# Patient Record
Sex: MALE | Race: WHITE | NOT HISPANIC OR LATINO | Employment: OTHER | URBAN - METROPOLITAN AREA
[De-identification: names, ages, dates, MRNs, and addresses within clinical notes are randomized per-mention and may not be internally consistent; named-entity substitution may affect disease eponyms.]

---

## 2017-07-05 PROBLEM — E78.5 HYPERLIPIDEMIA: Status: ACTIVE | Noted: 2017-07-05

## 2017-07-05 PROBLEM — G47.00 INSOMNIA: Status: ACTIVE | Noted: 2017-07-05

## 2019-01-16 PROBLEM — G47.00 INSOMNIA: Status: RESOLVED | Noted: 2017-07-05 | Resolved: 2019-01-16

## 2019-11-19 ENCOUNTER — OFFICE VISIT (OUTPATIENT)
Dept: PODIATRY | Facility: CLINIC | Age: 69
End: 2019-11-19
Payer: MEDICARE

## 2019-11-19 VITALS
BODY MASS INDEX: 39.49 KG/M2 | HEART RATE: 68 BPM | SYSTOLIC BLOOD PRESSURE: 120 MMHG | HEIGHT: 65 IN | WEIGHT: 237 LBS | DIASTOLIC BLOOD PRESSURE: 57 MMHG

## 2019-11-19 DIAGNOSIS — M79.675 TOE PAIN, BILATERAL: Primary | ICD-10-CM

## 2019-11-19 DIAGNOSIS — M79.674 TOE PAIN, BILATERAL: Primary | ICD-10-CM

## 2019-11-19 DIAGNOSIS — L84 CORN OR CALLUS: ICD-10-CM

## 2019-11-19 PROCEDURE — 99999 PR PBB SHADOW E&M-NEW PATIENT-LVL III: CPT | Mod: PBBFAC,,, | Performed by: PODIATRIST

## 2019-11-19 PROCEDURE — 99203 PR OFFICE/OUTPT VISIT, NEW, LEVL III, 30-44 MIN: ICD-10-PCS | Mod: S$PBB,,, | Performed by: PODIATRIST

## 2019-11-19 PROCEDURE — 99999 PR PBB SHADOW E&M-NEW PATIENT-LVL III: ICD-10-PCS | Mod: PBBFAC,,, | Performed by: PODIATRIST

## 2019-11-19 PROCEDURE — 99203 OFFICE O/P NEW LOW 30 MIN: CPT | Mod: S$PBB,,, | Performed by: PODIATRIST

## 2019-11-19 PROCEDURE — 1159F PR MEDICATION LIST DOCUMENTED IN MEDICAL RECORD: ICD-10-PCS | Mod: ,,, | Performed by: PODIATRIST

## 2019-11-19 PROCEDURE — 1126F PR PAIN SEVERITY QUANTIFIED, NO PAIN PRESENT: ICD-10-PCS | Mod: ,,, | Performed by: PODIATRIST

## 2019-11-19 PROCEDURE — 1126F AMNT PAIN NOTED NONE PRSNT: CPT | Mod: ,,, | Performed by: PODIATRIST

## 2019-11-19 PROCEDURE — 99203 OFFICE O/P NEW LOW 30 MIN: CPT | Mod: PBBFAC,PN | Performed by: PODIATRIST

## 2019-11-19 PROCEDURE — 1159F MED LIST DOCD IN RCRD: CPT | Mod: ,,, | Performed by: PODIATRIST
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
## 2020-09-17 ENCOUNTER — OFFICE VISIT (OUTPATIENT)
Dept: PAIN MEDICINE | Facility: CLINIC | Age: 70
End: 2020-09-17
Payer: MEDICARE

## 2020-09-17 VITALS
WEIGHT: 240 LBS | RESPIRATION RATE: 20 BRPM | HEIGHT: 65 IN | SYSTOLIC BLOOD PRESSURE: 115 MMHG | DIASTOLIC BLOOD PRESSURE: 53 MMHG | TEMPERATURE: 98 F | HEART RATE: 62 BPM | BODY MASS INDEX: 39.99 KG/M2 | OXYGEN SATURATION: 96 %

## 2020-09-17 DIAGNOSIS — M51.36 DDD (DEGENERATIVE DISC DISEASE), LUMBAR: ICD-10-CM

## 2020-09-17 DIAGNOSIS — M47.816 LUMBAR SPONDYLOSIS: Primary | ICD-10-CM

## 2020-09-17 DIAGNOSIS — M54.16 LUMBAR RADICULOPATHY: ICD-10-CM

## 2020-09-17 DIAGNOSIS — Z11.9 SCREENING EXAMINATION FOR INFECTIOUS DISEASE: ICD-10-CM

## 2020-09-17 PROCEDURE — 99204 OFFICE O/P NEW MOD 45 MIN: CPT | Mod: S$PBB,,, | Performed by: ANESTHESIOLOGY

## 2020-09-17 PROCEDURE — 99204 PR OFFICE/OUTPT VISIT, NEW, LEVL IV, 45-59 MIN: ICD-10-PCS | Mod: S$PBB,,, | Performed by: ANESTHESIOLOGY

## 2020-09-17 PROCEDURE — 99999 PR PBB SHADOW E&M-EST. PATIENT-LVL V: ICD-10-PCS | Mod: PBBFAC,,, | Performed by: ANESTHESIOLOGY

## 2020-09-17 PROCEDURE — 99999 PR PBB SHADOW E&M-EST. PATIENT-LVL V: CPT | Mod: PBBFAC,,, | Performed by: ANESTHESIOLOGY

## 2020-09-17 PROCEDURE — 99215 OFFICE O/P EST HI 40 MIN: CPT | Mod: PBBFAC,PN | Performed by: ANESTHESIOLOGY

## 2020-09-17 RX ORDER — SODIUM CHLORIDE, SODIUM LACTATE, POTASSIUM CHLORIDE, CALCIUM CHLORIDE 600; 310; 30; 20 MG/100ML; MG/100ML; MG/100ML; MG/100ML
INJECTION, SOLUTION INTRAVENOUS CONTINUOUS
Status: CANCELLED | OUTPATIENT
Start: 2020-09-29
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
## 2020-09-17 NOTE — LETTER
September 18, 2020      Mk Russell MD  [address]  [place] [address]           [place] [facility]  [address]  [facility] [address]  Phone: [phone]  Fax: [phone]          Patient: Fox Bell   MR Number: [id number]   YOB: 1950   Date of Visit: 9/17/2020       Dear Dr. Mk Russell:    Thank you for referring Fox Bell to me for evaluation. Attached you will find relevant portions of my assessment and plan of care.    If you have questions, please do not hesitate to call me. I look forward to following Fox Bell along with you.    Sincerely,    Lemuel Christian MD    Enclosure  CC:  No Recipients    If you would like to receive this communication electronically, please contact [email or web address] or [phone] to request more information on [employer] access.    For providers and/or their staff who would like to refer a patient to [facility], please contact us through our one-stop-shop provider referral line, [facility], at [phone].    If you feel you have received this communication in error or would no longer like to receive these types of communications, please e-mail [email or web address]

## 2020-09-26 ENCOUNTER — LAB VISIT (OUTPATIENT)
Dept: FAMILY MEDICINE | Facility: CLINIC | Age: 70
End: 2020-09-26
Payer: MEDICARE

## 2020-09-26 DIAGNOSIS — Z11.9 SCREENING EXAMINATION FOR INFECTIOUS DISEASE: ICD-10-CM

## 2020-09-26 PROCEDURE — U0003 INFECTIOUS AGENT DETECTION BY NUCLEIC ACID (DNA OR RNA); SEVERE ACUTE RESPIRATORY SYNDROME CORONAVIRUS 2 (SARS-COV-2) (CORONAVIRUS DISEASE [COVID-19]), AMPLIFIED PROBE TECHNIQUE, MAKING USE OF HIGH THROUGHPUT TECHNOLOGIES AS DESCRIBED BY CMS-2020-01-R: HCPCS

## 2020-09-27 LAB — SARS-COV-2 RNA RESP QL NAA+PROBE: NOT DETECTED

## 2020-09-28 ENCOUNTER — TELEPHONE (OUTPATIENT)
Dept: PAIN MEDICINE | Facility: CLINIC | Age: 70
End: 2020-09-28

## 2020-09-28 NOTE — TELEPHONE ENCOUNTER
Patient asked could he attend physical therapy the day after his procedure. Informed patient that is fine and to perform activities as tolerated. Patient also asked about restarting his aspirin and multivitamins after the procedure. Advise patient that was okay.

## 2020-09-28 NOTE — TELEPHONE ENCOUNTER
----- Message from Ruby Trejo sent at 9/28/2020  8:14 AM CDT -----  Patient is asking for a return call regarding his procedure tomorrow.  He has questions.  His number is [phone].  Thank you!

## 2020-09-29 ENCOUNTER — HOSPITAL ENCOUNTER (OUTPATIENT)
Dept: RADIOLOGY | Facility: HOSPITAL | Age: 70
Discharge: HOME | End: 2020-09-29
Attending: ANESTHESIOLOGY
Payer: MEDICARE

## 2020-09-29 ENCOUNTER — HOSPITAL ENCOUNTER (OUTPATIENT)
Facility: HOSPITAL | Age: 70
Discharge: HOME | End: 2020-09-29
Attending: ANESTHESIOLOGY | Admitting: ANESTHESIOLOGY
Payer: MEDICARE

## 2020-09-29 DIAGNOSIS — M54.16 LUMBAR RADICULOPATHY: Primary | ICD-10-CM

## 2020-09-29 DIAGNOSIS — M51.36 DDD (DEGENERATIVE DISC DISEASE), LUMBAR: ICD-10-CM

## 2020-09-29 PROCEDURE — 62323 PR INJ LUMBAR/SACRAL, W/IMAGING GUIDANCE: ICD-10-PCS | Mod: ,,, | Performed by: ANESTHESIOLOGY

## 2020-09-29 PROCEDURE — 76000 FLUOROSCOPY <1 HR PHYS/QHP: CPT | Mod: TC,PO

## 2020-09-29 PROCEDURE — 63600175 PHARM REV CODE 636 W HCPCS: Mod: PO | Performed by: ANESTHESIOLOGY

## 2020-09-29 PROCEDURE — 62323 NJX INTERLAMINAR LMBR/SAC: CPT | Mod: PO | Performed by: ANESTHESIOLOGY

## 2020-09-29 PROCEDURE — 25000003 PHARM REV CODE 250: Mod: PO | Performed by: ANESTHESIOLOGY

## 2020-09-29 PROCEDURE — A4216 STERILE WATER/SALINE, 10 ML: HCPCS | Mod: PO | Performed by: ANESTHESIOLOGY

## 2020-09-29 PROCEDURE — 62323 NJX INTERLAMINAR LMBR/SAC: CPT | Mod: ,,, | Performed by: ANESTHESIOLOGY

## 2020-09-29 PROCEDURE — 25500020 PHARM REV CODE 255: Mod: PO | Performed by: ANESTHESIOLOGY

## 2020-09-29 RX ORDER — SODIUM CHLORIDE, SODIUM LACTATE, POTASSIUM CHLORIDE, CALCIUM CHLORIDE 600; 310; 30; 20 MG/100ML; MG/100ML; MG/100ML; MG/100ML
INJECTION, SOLUTION INTRAVENOUS CONTINUOUS
Status: DISCONTINUED | OUTPATIENT
Start: 2020-09-29 | End: 2020-09-29

## 2020-09-29 RX ORDER — ALPRAZOLAM 0.5 MG/1
1 TABLET, ORALLY DISINTEGRATING ORAL ONCE
Status: COMPLETED | OUTPATIENT
Start: 2020-09-29 | End: 2020-09-29

## 2020-09-29 RX ORDER — METHYLPREDNISOLONE ACETATE 80 MG/ML
INJECTION, SUSPENSION INTRA-ARTICULAR; INTRALESIONAL; INTRAMUSCULAR; SOFT TISSUE
Status: DISCONTINUED | OUTPATIENT
Start: 2020-09-29 | End: 2020-09-29 | Stop reason: HOSPADM

## 2020-09-29 RX ORDER — SODIUM CHLORIDE 9 MG/ML
INJECTION, SOLUTION INTRAMUSCULAR; INTRAVENOUS; SUBCUTANEOUS
Status: DISCONTINUED | OUTPATIENT
Start: 2020-09-29 | End: 2020-09-29 | Stop reason: HOSPADM

## 2020-09-29 RX ORDER — LIDOCAINE HYDROCHLORIDE 10 MG/ML
INJECTION, SOLUTION EPIDURAL; INFILTRATION; INTRACAUDAL; PERINEURAL
Status: DISCONTINUED | OUTPATIENT
Start: 2020-09-29 | End: 2020-09-29 | Stop reason: HOSPADM

## 2020-09-29 RX ADMIN — ALPRAZOLAM 1 MG: 0.5 TABLET, ORALLY DISINTEGRATING ORAL at 02:09

## 2020-09-29 NOTE — DISCHARGE SUMMARY
[facility]  Discharge Note  Short Stay    Admit Date: 9/29/2020    Discharge Date: 9/29/2020    Attending Physician: Lemuel Christian MD     Discharge Provider: Lemuel Christian    Diagnoses:  Active Hospital Problems    Diagnosis  POA    *Lumbar radiculopathy [M54.16]  Yes      Resolved Hospital Problems   No resolved problems to display.       Discharged Condition: good    Final Diagnoses: Lumbar radiculopathy [M54.16]    Disposition: Home or Self Care    Hospital Course: no complications, uneventful    Outcome of Hospitalization, Treatment, Procedure, or Surgery:  Patient was admitted for outpatient procedure. The patient underwent procedure without complications and are discharged home    Follow up/Patient Instructions:  Follow up as scheduled in Pain Management clinic in 3-4 weeks/Patient has received instructions and follow up date and time    Medications:  Continue previous medications    Discharge Procedure Orders   Call MD for:  temperature >100.4     Call MD for:  severe uncontrolled pain     Call MD for:  redness, tenderness, or signs of infection (pain, swelling, redness, odor or green/yellow discharge around incision site)     Call MD for:  severe persistent headache     No dressing needed         Discharge Procedure Orders (must include Diet, Follow-up, Activity):   Discharge Procedure Orders (must include Diet, Follow-up, Activity)   Call MD for:  temperature >100.4     Call MD for:  severe uncontrolled pain     Call MD for:  redness, tenderness, or signs of infection (pain, swelling, redness, odor or green/yellow discharge around incision site)     Call MD for:  severe persistent headache     No dressing needed

## 2020-09-29 NOTE — DISCHARGE INSTRUCTIONS
PAIN MANAGEMENT    Home care instructions   Apply ice pack to the injection site for 20 minute prior for the first 24 hours for soreness/discomfort at injection site   DO NOT USE HEAT FOR 24 HOURS   Keep site clean and dry for 24 hours, remove bandaid when desired   Do not drive until tomorrow  Take care when walking after a lumbar injection     STEROIDS    May take 10-14 days for full effects  Avoid strenuous exercises for 2 days  Resume Aspirin, Plavix, or Coumadin the day after the procedure unless other wise instructed  Resume home medication as prescribed today      CALL PHYSICIAN FOR:   Severe increase in your usual pain or appearance of new pain   Prolonged or increasing weakness or numbness in the legs or arms   Fever greater then 100 degrees F..   Drainage from the incision site, redness, active bleeding or increased swelling at the injection site   Headache that increases when your head is upright and decreases when you lie flat    FOR EMERGENCIES:   Go directly to Emergency Department for Shortness of breath, chest pain, or problems breathing     Routed to Dr. Kobi CARTER.

## 2020-09-29 NOTE — OP NOTE

## 2020-09-30 ENCOUNTER — CLINICAL SUPPORT (OUTPATIENT)
Dept: REHABILITATION | Facility: HOSPITAL | Age: 70
End: 2020-09-30
Attending: ANESTHESIOLOGY
Payer: MEDICARE

## 2020-09-30 VITALS
OXYGEN SATURATION: 95 % | RESPIRATION RATE: 16 BRPM | WEIGHT: 238 LBS | TEMPERATURE: 99 F | BODY MASS INDEX: 39.65 KG/M2 | HEART RATE: 57 BPM | HEIGHT: 65 IN | SYSTOLIC BLOOD PRESSURE: 126 MMHG | DIASTOLIC BLOOD PRESSURE: 61 MMHG

## 2020-09-30 DIAGNOSIS — M54.50 LUMBAR PAIN: ICD-10-CM

## 2020-09-30 DIAGNOSIS — R53.1 WEAKNESS: ICD-10-CM

## 2020-09-30 DIAGNOSIS — M51.36 DDD (DEGENERATIVE DISC DISEASE), LUMBAR: ICD-10-CM

## 2020-09-30 DIAGNOSIS — M47.816 LUMBAR SPONDYLOSIS: ICD-10-CM

## 2020-09-30 PROCEDURE — 97161 PT EVAL LOW COMPLEX 20 MIN: CPT | Mod: PO | Performed by: PHYSICAL THERAPIST

## 2020-09-30 NOTE — PLAN OF CARE
"[facility]  Physical Therapy Initial Evaluation    Name: Fox Bell  Clinic Number: [id number]    Therapy Diagnosis:   Encounter Diagnoses   Name Primary?    Lumbar spondylosis     DDD (degenerative disc disease), lumbar     Lumbar pain     Weakness      Physician: Lemuel Christian MD    Physician Orders: PT Eval and Treat   Medical Diagnosis from Referral: Lumbar spondylosis  Evaluation Date: 2020  Authorization Period Expiration: 20  Plan of Care Expiration: 20  Visit # / Visits authorized:     Time In: 11:03 AM   Time Out: 11:38 AM   Total Billable Time: 35 minutes    Precautions: Standard    Subjective   Date of onset: 2020  History of current condition - Fox reports: Having intermittent back pain for several years. He had an exacerbation of his back pain in July after bending over and picking vegetables. He states that he had pain in his back that radiated down the back of his leg to the knee. He also reports having pain after prolonged periods of sitting. He had an LENA yesterday and reports that he doesn't have any pain. He also reports difficulty ascending/descending stairs.        Past Medical History:   Diagnosis Date    b Hypertension     On Dyazide 37.5/25 Mg qAM, And Lisinopril 20 Mg Daily, Toprol-XL 50 Mg Daily, And Procardia-XL 30 Mg Daily    c Hypercholesterolemia     On Crestor 10 Mg qHS    d Family H/O DM     f Obesity     i VIBHA On CPAP     19 Referred To Dr. Candelaria Segovia; Dr. Farrah haywood External Hemorrhoids     Dr. TRE haywood H/O Colon Polyps On Previous TC     Dr. TRE Ness (With No Polyps On Follow-Up 17 TC): "Repeat TC In 3 Years"    k Borderline Low Testosterone Level     18 Total Testosterone = 377 (300-720); 18 Free Testosterone = 1.4 % (1.6-2.9%)    k Family H/O Prostate Cancer     His  Father    l Advanced Bilateral Knee Osteoarthritis     Dr. HOLLAND Giraldo    " l Lumbar Spinal Degenerative Disc Disease     7/26/20 Referred To Dr. Lemuel Christian; 7/22/20 L-Spine MRI W/O Contrast = (See Report)    m Chronic Bilateral Hand Tremor     His Father And Brother Also Have Tremors    m Chronic Fatigue     6/27/18 TSH = Normal; 7/19/18 Total Testosterone = 377 (300-720); 7/19/18 Free Testosterone = 1.4 % (1.6-2.9%)    n Chronic Insomnia     1/16/19 RXd Trazodone  Mg qHS PRN; On Sonata 10 Mg qHS PRN    o Allergic Rhinosinusitis     Wellness Visit 7/17/20      Fox Bell  has a past surgical history that includes Knee arthroscopy (Right); Knee surgery (Left); Bladder surgery; arm surgery (Left, 1979); and Epidural steroid injection into lumbar spine (N/A, 9/29/2020).    Fox has a current medication list which includes the following prescription(s): aspirin, glucosamine/chondro mckeon a, lisinopril, meloxicam, metoprolol succinate, mv-mins/folic/lycopene/ginkgo, nifedipine, rosuvastatin, trazodone, triamcinolone acetonide 0.1%, triamcinolone acetonide 0.5%, triamterene-hydrochlorothiazide 37.5-25 mg, UNABLE TO FIND, and zaleplon.    Review of patient's allergies indicates:   Allergen Reactions    Hydrocodone-acetaminophen Hives     Other reaction(s): hives, itchy        Imaging, MRI studies:    1. Multilevel spondylosis without significant spinal canal stenosis.  2. Multilevel foraminal stenosis, greatest on the right at L4-5 and the left at L5-S1 where it is mild-moderate.    Prior Therapy: None  Social History:  lives with their spouse in a single story home   Occupation: retired   Prior Level of Function: No limitations related to LBP   Current Level of Function: Difficulty ascending/descending stairs, reduced tolerance to prolonged sitting     Pain:  Current 0/10, worst 4/10, best 0/10   Location: rightl back   Description: Shooting  Aggravating Factors: Sitting and Bending  Easing Factors: changing positions     Pts goals: To decrease back pain and improve ease of  ascending/descending stairs       Objective   Mental status: alert, oriented x3  Posture/ Alignment: Fair, reduced lumbar lordosis     GAIT DEVIATIONS: Fox matamoros with normal gait mechanics .    ROM:   AROM  Comment   Flexion: Mild loss      Extension: Moerate loss      Lat Flex R: WNL     Lat Flex L: WNL     Rotation R: WNL     Rotation L: WNL     *pain    Strength: Dermatomes:   Right Left Comment   L2 intact intact    L3 intact intact    L4 intact intact    L5 intact intact    S1 intact intact    S2 intact intact    Saddle intact intact      Myotomes:   Right Left Comment   Hip flexion (L2-3): 5/5 5/5    Knee extension (L3-4): 5/5 5/5    DF (L4-5): 5/5 5/5    Great Toe Ext (L5-S1): 5/5 5/5    Great Toe Flex (S1-S2): 5/5 5/5      Hip extension: 4/5 bilaterally      DTR:   Right Left Comment   Patellar (L3-4) 2+ 2+    Achilles (S1) 1+ 1+        Special Tests:  Repeated ROM ROM (% of normal) Pain? Radiation?   Flex Stand:      Ext Stand:      Flex Supine: 100% Y    Ext Prone: 100% N N     Functional Tests (* indicates w/ pain)   30 second stand test: 15x    Palpation:  No TTP elicited     Pt/family was provided educational information, including: role of PT, goals for PT, scheduling - pt verbalized understanding. Discussed insurance plan with pt.       CMS Impairment/Limitation/Restriction for FOTO Lumbar Survey    Therapist reviewed FOTO scores for Fox Bell on 9/30/2020.   FOTO documents entered into [facility] - see Media section.    Limitation Score: 37%  Category: Mobility    Current : CJ = at least 20% but < 40% impaired, limited or restricted               TREATMENT   Treatment Time In: 11:33 AM   Treatment Time Out: 11:38 AM   Total Treatment time separate from Evaluation: 5 minutes    Fox received therapeutic exercises to develop strength for 5 minutes including:  HEP setup and instruction; handout issued     Home Exercises and Patient Education Provided    Education provided:   - Pathophysiology of condition    - HEP   - POC     Written Home Exercises Provided: yes.  Exercises were reviewed and Fox was able to demonstrate them prior to the end of the session.  Fox demonstrated good  understanding of the education provided.     See EMR under Patient Instructions for exercises provided 9/30/2020.      Assessment   Pt presents with a medical diagnosis of Lumbar spondylosis. He has intermittent lumbar and lower extremity pain, limited lumbar ROM and LE weakness. These impairments are limiting his ability to perform activities that require frequent forward bending and stair climbing. His symptoms are consistent with a lumbar derangement. He will benefit from physical therapy treatment to assist in reducing symptoms and restoring function.     Pt prognosis is Excellent.   Pt will benefit from skilled outpatient Physical Therapy to address the deficits stated above and in the chart below, provide pt/family education, and to maximize pt's level of independence.     Plan of care discussed with patient: Yes  Pt's spiritual, cultural and educational needs considered and patient is agreeable to the plan of care and goals as stated below:     Anticipated Barriers for therapy: None     Medical Necessity is demonstrated by the following  History  Co-morbidities and personal factors that may impact the plan of care Co-morbidities:   CAD, high BMI, HTN and VIBHA    Personal Factors:   age     moderate   Examination  Body Structures and Functions, activity limitations and participation restrictions that may impact the plan of care Body Regions:   back  lower extremities    Body Systems:    ROM  strength  transitions    Participation Restrictions:       Activity limitations:   Learning and applying knowledge  no deficits    General Tasks and Commands  no deficits    Communication  no deficits    Mobility  ascending/descending stairs     Self care  no deficits    Domestic Life  doing house work (cleaning house, washing dishes,  laundry)    Interactions/Relationships  no deficits    Life Areas  no deficits    Community and Social Life  no deficits         moderate   Clinical Presentation stable and uncomplicated low   Decision Making/ Complexity Score: low     Goals:  Short Term Goals: 3 weeks   1) Pt will be I with HEP   2) Pt will have pain no worse then 2/10 during ADL activities     Long Term Goals: 6 weeks   1) Patient will ascend/descend 2 flights of stairs without limitations   2) Pt will perform all ADL activities without limitations related to lumbar pain         Plan   Plan of care Certification: 9/30/2020 to 11/11/20.    Outpatient Physical Therapy 2 times weekly for 6 weeks to include the following interventions: Manual Therapy, Neuromuscular Re-ed, Patient Education, Therapeutic Activites and Therapeutic Exercise.     Dany Waller, PT

## 2020-10-05 NOTE — PROGRESS NOTES
Physical Therapy Daily Treatment Note     Name: Fox Bell  Clinic Number: [id number]    Therapy Diagnosis:   Encounter Diagnoses   Name Primary?    Lumbar pain     Weakness      Physician: Lemuel Christian MD    Visit Date: 10/6/2020  Physician Orders: PT Eval and Treat   Medical Diagnosis from Referral: Lumbar spondylosis  Evaluation Date: 9/30/2020  Authorization Period Expiration: 12/31/20  Plan of Care Expiration: 11/11/20  Visit # / Visits authorized: 1/      Time In: 1300  Time Out: 1340  Total Billable Time: 40 minutes     Precautions: Standard      Subjective     Pt reports: Had a quick pain in the buttocks since the evaluation but it only occurred once.  Pt reports he has been trying to walk 2 miles a day.  He was compliant with home exercise program.  Response to previous treatment:no soreness  Functional change: too soon to tell    Pain: 0/10  Location: right back      Objective     Fox received therapeutic exercises to develop strength for 40minutes including:    LTR x 10 - cueing to avoid holding his breath  Piriformis stretch 3 x 30 sec  Prone on elbows x 3 min  Prone press ups x 10  Prone hip extension x 10  Bridges x 10  SL clamshells RTB 2 x 10      Standing lumbar extension x 10  Standing hip extension x 10  Sit to stand 2 x 10 lowering table for 2nd set  Step ups x 10  Gastroc stretch 3 x 30 sec  Mini squats x 10  Leg Press #50 2 x 10    Home Exercises Provided and Patient Education Provided     Education provided:   - HEP    Written Home Exercises Provided: yes.  Exercises were reviewed and Fox was able to demonstrate them prior to the end of the session.  Fox demonstrated good  understanding of the education provided.     See EMR under Media for exercises provided 10/6/2020.    Assessment     Pt tolerated treatment very well with no reports of any increase in symptoms. Depending on patient's response, he should be able to be progressed next treatment.  Fox is progressing well  towards his goals.   Pt prognosis is Excellent.     Pt will continue to benefit from skilled outpatient physical therapy to address the deficits listed in the problem list box on initial evaluation, provide pt/family education and to maximize pt's level of independence in the home and community environment.     Pt's spiritual, cultural and educational needs considered and pt agreeable to plan of care and goals.    Anticipated barriers to physical therapy: none    Goals:     Short Term Goals: 3 weeks   1) Pt will be I with HEP   2) Pt will have pain no worse then 2/10 during ADL activities      Long Term Goals: 6 weeks   1) Patient will ascend/descend 2 flights of stairs without limitations   2) Pt will perform all ADL activities without limitations related to lumbar pain          Plan     Plan of care Certification: 9/30/2020 to 11/11/20.         Dianne Gomes, PTA

## 2020-10-06 ENCOUNTER — CLINICAL SUPPORT (OUTPATIENT)
Dept: REHABILITATION | Facility: HOSPITAL | Age: 70
End: 2020-10-06
Attending: ANESTHESIOLOGY
Payer: MEDICARE

## 2020-10-06 DIAGNOSIS — M54.50 LUMBAR PAIN: ICD-10-CM

## 2020-10-06 DIAGNOSIS — R53.1 WEAKNESS: ICD-10-CM

## 2020-10-06 PROCEDURE — 97110 THERAPEUTIC EXERCISES: CPT | Mod: PO,CQ

## 2020-10-08 ENCOUNTER — CLINICAL SUPPORT (OUTPATIENT)
Dept: REHABILITATION | Facility: HOSPITAL | Age: 70
End: 2020-10-08
Attending: ANESTHESIOLOGY
Payer: MEDICARE

## 2020-10-08 DIAGNOSIS — M54.50 LUMBAR PAIN: ICD-10-CM

## 2020-10-08 DIAGNOSIS — R53.1 WEAKNESS: ICD-10-CM

## 2020-10-08 PROCEDURE — 97110 THERAPEUTIC EXERCISES: CPT | Mod: PO,CQ

## 2020-10-08 NOTE — PROGRESS NOTES
Physical Therapy Daily Treatment Note     Name: Fox Bell  Clinic Number: [id number]    Therapy Diagnosis:   Encounter Diagnoses   Name Primary?    Lumbar pain     Weakness      Physician: Lemuel Christian MD    Visit Date: 10/8/2020  Physician Orders: PT Eval and Treat   Medical Diagnosis from Referral: Lumbar spondylosis  Evaluation Date: 9/30/2020  Authorization Period Expiration: 12/31/20  Plan of Care Expiration: 11/11/20  Visit # / Visits authorized: 2/20     Time In: 1302  Time Out: 1442  Total Billable Time: 40 minutes     Precautions: Standard      Subjective     Pt reports: no complaints  He was compliant with home exercise program.  Response to previous treatment:no soreness  Functional change: too soon to tell    Pain: 0/10  Location: right back      Objective     Fox received therapeutic exercises to develop strength for 40 minutes including:    LTR x 15 - cueing to avoid holding his breath  Piriformis stretch 3 x 30 sec  Prone on elbows x 2 min  Prone press ups x 10  Prone hip extension x 10  Bridges 2x10  SL clamshells GTB 2 x 10      Standing lumbar extension 2x10  Standing hip extension 2x10  Sit to stand 2 x 10  Step ups 2x10  Gastroc stretch 3 x 30 sec  Mini squats 2x10      Leg Press #60 2 x 10  Lumbar extension machine 60# 2x10    Home Exercises Provided and Patient Education Provided     Education provided:   - HEP    Written Home Exercises Provided: Patient instructed to cont prior HEP.  Exercises were reviewed and Fox was able to demonstrate them prior to the end of the session.  Fox demonstrated good  understanding of the education provided.     See EMR under Media for exercises provided 10/6/2020.    Assessment     Pt tolerated treatment very well with no reports of any increase in symptoms. Pt able to progress reps and weight in multiple exercises today with no problem.  Fox is progressing well towards his goals.   Pt prognosis is Excellent.     Pt will continue to benefit  from skilled outpatient physical therapy to address the deficits listed in the problem list box on initial evaluation, provide pt/family education and to maximize pt's level of independence in the home and community environment.     Pt's spiritual, cultural and educational needs considered and pt agreeable to plan of care and goals.    Anticipated barriers to physical therapy: none    Goals:     Short Term Goals: 3 weeks   1) Pt will be I with HEP   2) Pt will have pain no worse then 2/10 during ADL activities      Long Term Goals: 6 weeks   1) Patient will ascend/descend 2 flights of stairs without limitations   2) Pt will perform all ADL activities without limitations related to lumbar pain          Plan     Plan of care Certification: 9/30/2020 to 11/11/20.         Dianne Gomes, PTA

## 2020-10-13 ENCOUNTER — CLINICAL SUPPORT (OUTPATIENT)
Dept: REHABILITATION | Facility: HOSPITAL | Age: 70
End: 2020-10-13
Attending: ANESTHESIOLOGY
Payer: MEDICARE

## 2020-10-13 DIAGNOSIS — M54.50 LUMBAR PAIN: ICD-10-CM

## 2020-10-13 DIAGNOSIS — R53.1 WEAKNESS: ICD-10-CM

## 2020-10-13 PROCEDURE — 97110 THERAPEUTIC EXERCISES: CPT | Mod: PO,CQ

## 2020-10-13 NOTE — PROGRESS NOTES
Physical Therapy Daily Treatment Note     Name: Fox Bell  Clinic Number: [id number]    Therapy Diagnosis:   Encounter Diagnoses   Name Primary?    Lumbar pain     Weakness      Physician: Lemuel Christian MD    Visit Date: 10/13/2020  Physician Orders: PT Eval and Treat   Medical Diagnosis from Referral: Lumbar spondylosis  Evaluation Date: 9/30/2020  Authorization Period Expiration: 12/31/20  Plan of Care Expiration: 11/11/20  Visit # / Visits authorized: 3/20     Time In: 1302  Time Out: 1345  Total Billable Time: 43 minutes     Precautions: Standard      Subjective     Pt reports: no complaints.  Pt has noticed some cramping in the front of his hip when he is performing the piriformis stretch.  He was compliant with home exercise program.  Response to previous treatment: no soreness  Functional change: too soon to tell    Pain: 0/10  Location: right back      Objective     Fox received therapeutic exercises to develop strength for 43 minutes including:    LTR x 10  Piriformis stretch 3 x 30 sec  Prone press ups 2x10  Prone hip extension x 10  Bridges 3x10  SL clamshells GTB 3 x 10  Supine hip flexor stretch x 1 min    Standing lumbar extension 2x10  Standing hip extension 2x10  Sit to stand 2 x 10  Step ups 2x10  Gastroc stretch 3 x 30 sec  Mini squats 2x10    Leg Press #80 2 x 10  Lumbar extension machine 70# 3x10    Home Exercises Provided and Patient Education Provided     Education provided:   - HEP    Written Home Exercises Provided: Patient instructed to cont prior HEP.  Exercises were reviewed and Fox was able to demonstrate them prior to the end of the session.  Fox demonstrated good  understanding of the education provided.     See EMR under Media for exercises provided 10/6/2020.    Assessment     Pt tolerated treatment very well with no reports of any increase in symptoms. Pt able to progress reps and weight in multiple exercises today with no problem.  Pt continues to do well with  extension based exercises.  Fox is progressing well towards his goals.   Pt prognosis is Excellent.     Pt will continue to benefit from skilled outpatient physical therapy to address the deficits listed in the problem list box on initial evaluation, provide pt/family education and to maximize pt's level of independence in the home and community environment.     Pt's spiritual, cultural and educational needs considered and pt agreeable to plan of care and goals.    Anticipated barriers to physical therapy: none    Goals:     Short Term Goals: 3 weeks   1) Pt will be I with HEP   2) Pt will have pain no worse then 2/10 during ADL activities      Long Term Goals: 6 weeks   1) Patient will ascend/descend 2 flights of stairs without limitations   2) Pt will perform all ADL activities without limitations related to lumbar pain          Plan     Plan of care Certification: 9/30/2020 to 11/11/20.         Dianne Gomes, PTA

## 2020-10-15 ENCOUNTER — CLINICAL SUPPORT (OUTPATIENT)
Dept: REHABILITATION | Facility: HOSPITAL | Age: 70
End: 2020-10-15
Attending: ANESTHESIOLOGY
Payer: MEDICARE

## 2020-10-15 DIAGNOSIS — R53.1 WEAKNESS: ICD-10-CM

## 2020-10-15 DIAGNOSIS — M54.50 LUMBAR PAIN: ICD-10-CM

## 2020-10-15 PROCEDURE — 97110 THERAPEUTIC EXERCISES: CPT | Mod: PO,CQ

## 2020-10-15 NOTE — PROGRESS NOTES
"  Physical Therapy Daily Treatment Note     Name: Fox Bell  Clinic Number: [id number]    Therapy Diagnosis:   Encounter Diagnoses   Name Primary?    Lumbar pain     Weakness      Physician: Lemuel Christian MD    Visit Date: 10/15/2020  Physician Orders: PT Eval and Treat   Medical Diagnosis from Referral: Lumbar spondylosis  Evaluation Date: 9/30/2020  Authorization Period Expiration: 12/31/20  Plan of Care Expiration: 11/11/20  Visit # / Visits authorized: 4/20     Time In: 1302  Time Out: 1345  Total Billable Time: 43 minutes     Precautions: Standard      Subjective     Pt reports: no complaints. .  He was compliant with home exercise program.  Response to previous treatment: " felt good"  Functional change: too soon to tell    Pain: 0/10  Location: right back      Objective     Fox received therapeutic exercises to develop strength for 43 minutes including:    LTR x 10  Piriformis stretch 2 x 30 sec  Prone press ups 2x10  Prone hip extension 2 x 10  Bridges 3x10  SL clamshells BTB 3 x 10  Supine hip flexor stretch x 1 min    Standing lumbar extension 2x10  Standing hip extension 2x10 YLB  Sit to stand 2 x 10- NP  Step ups 2x10  Gastroc stretch 3 x 30 sec  Mini squats 2x10- NP    Leg Press #100 3 x 10  Lumbar extension machine 70# 3x10    Home Exercises Provided and Patient Education Provided     Education provided:   - HEP    Written Home Exercises Provided: Patient instructed to cont prior HEP.  Exercises were reviewed and Fox was able to demonstrate them prior to the end of the session.  Fox demonstrated good  understanding of the education provided.     See EMR under Media for exercises provided 10/6/2020.    Assessment     Pt tolerated treatment very well with no reports of any increase in symptoms. Pt able to progress reps and weight with mild muscle fatigue.  Pt continues to do well with extension based exercises.  Fox is progressing well towards his goals.   Pt prognosis is Excellent.     Pt " will continue to benefit from skilled outpatient physical therapy to address the deficits listed in the problem list box on initial evaluation, provide pt/family education and to maximize pt's level of independence in the home and community environment.     Pt's spiritual, cultural and educational needs considered and pt agreeable to plan of care and goals.    Anticipated barriers to physical therapy: none    Goals:     Short Term Goals: 3 weeks   1) Pt will be I with HEP   2) Pt will have pain no worse then 2/10 during ADL activities      Long Term Goals: 6 weeks   1) Patient will ascend/descend 2 flights of stairs without limitations   2) Pt will perform all ADL activities without limitations related to lumbar pain          Plan     Plan of care Certification: 9/30/2020 to 11/11/20.         Dianne Gomes, PTA

## 2020-10-20 ENCOUNTER — CLINICAL SUPPORT (OUTPATIENT)
Dept: REHABILITATION | Facility: HOSPITAL | Age: 70
End: 2020-10-20
Attending: ANESTHESIOLOGY
Payer: MEDICARE

## 2020-10-20 DIAGNOSIS — R53.1 WEAKNESS: ICD-10-CM

## 2020-10-20 DIAGNOSIS — M54.50 LUMBAR PAIN: ICD-10-CM

## 2020-10-20 PROCEDURE — 97110 THERAPEUTIC EXERCISES: CPT | Mod: PO,CQ

## 2020-10-20 NOTE — PROGRESS NOTES
"  Physical Therapy Daily Treatment Note     Name: Fox Bell  Clinic Number: [id number]    Therapy Diagnosis:   Encounter Diagnoses   Name Primary?    Lumbar pain     Weakness      Physician: Lemuel Christian MD    Visit Date: 10/20/2020  Physician Orders: PT Eval and Treat   Medical Diagnosis from Referral: Lumbar spondylosis  Evaluation Date: 9/30/2020  Authorization Period Expiration: 12/31/20  Plan of Care Expiration: 11/11/20  Visit # / Visits authorized: 5/20     Time In: 1300  Time Out: 1345  Total Billable Time: 45 minutes     Precautions: Standard      Subjective     Pt reports: continues to do well with no pain.  Pt was able to use his push lawnmower to cut his lawn without issues.  Pt is still able to walk his 2 miles 5x-7x a week with less of an effort.  He was compliant with home exercise program.  Response to previous treatment: " felt good"  Functional change: Endurance doing housework    Pain: 0/10  Location: right back      Objective     Fox received therapeutic exercises to develop strength for 45 minutes including:    Recumbent bike x 5 min  LTR x 10  Piriformis stretch 2 x 30 sec  Prone press ups 2x10  Prone hip extension 2 x 10  Bridges 3x10  SL clamshells BTB 3 x 10  Supine hip flexor stretch x 1 min    Standing lumbar extension 2x10  Standing hip extension and abduction 2x10 RTB  Sit to stand 2 x 10- NP  Step ups 2x10  Gastroc stretch 3 x 30 sec  Mini squats 2x10- NP    Leg Press #100 3 x 10  Lumbar extension machine 100# 3x10    Home Exercises Provided and Patient Education Provided     Education provided:   - HEP    Written Home Exercises Provided: Patient instructed to cont prior HEP.  Exercises were reviewed and Fox was able to demonstrate them prior to the end of the session.  Fox demonstrated good  understanding of the education provided.     See EMR under Media for exercises provided 10/6/2020.    Assessment     Pt tolerated treatment very well with no reports of any increase " in symptoms. Pt able to progress reps and weight with mild muscle fatigue.   Fox is progressing well towards his goals.   Pt prognosis is Excellent.     Pt will continue to benefit from skilled outpatient physical therapy to address the deficits listed in the problem list box on initial evaluation, provide pt/family education and to maximize pt's level of independence in the home and community environment.     Pt's spiritual, cultural and educational needs considered and pt agreeable to plan of care and goals.    Anticipated barriers to physical therapy: none    Goals:     Short Term Goals: 3 weeks   1) Pt will be I with HEP   2) Pt will have pain no worse then 2/10 during ADL activities      Long Term Goals: 6 weeks   1) Patient will ascend/descend 2 flights of stairs without limitations   2) Pt will perform all ADL activities without limitations related to lumbar pain          Plan     Plan of care Certification: 9/30/2020 to 11/11/20.       Dianne Gomes, PTA

## 2020-10-22 ENCOUNTER — CLINICAL SUPPORT (OUTPATIENT)
Dept: REHABILITATION | Facility: HOSPITAL | Age: 70
End: 2020-10-22
Attending: ANESTHESIOLOGY
Payer: MEDICARE

## 2020-10-22 DIAGNOSIS — M54.50 LUMBAR PAIN: ICD-10-CM

## 2020-10-22 DIAGNOSIS — R53.1 WEAKNESS: ICD-10-CM

## 2020-10-22 PROCEDURE — 97110 THERAPEUTIC EXERCISES: CPT | Mod: PO | Performed by: PHYSICAL THERAPIST

## 2020-10-22 NOTE — PROGRESS NOTES
"  Physical Therapy Daily Treatment Note     Name: Fox Bell  Clinic Number: [id number]    Therapy Diagnosis:   Encounter Diagnoses   Name Primary?    Lumbar pain     Weakness      Physician: Lemuel Christian MD    Visit Date: 10/22/2020  Physician Orders: PT Eval and Treat   Medical Diagnosis from Referral: Lumbar spondylosis  Evaluation Date: 9/30/2020  Authorization Period Expiration: 12/31/20  Plan of Care Expiration: 11/11/20  Visit # / Visits authorized: 5/20     Time In: 1300  Time Out: 1345  Total Billable Time: 45 minutes     Precautions: Standard      Subjective     Pt reports: That his back is doing well. I get an occasional pain in my right leg.   He was compliant with home exercise program.  Response to previous treatment: " felt good"  Functional change: Endurance doing housework    Pain: 0/10  Location: right back      Objective     Fox received therapeutic exercises to develop strength for 40 minutes including:    Recumbent bike x 5 min  LTR x 10  Piriformis stretch 2 x 30 sec  Prone press ups 2x10  Prone hip extension 2 x 10  Bridges 3x10  SL clamshells BTB 3 x 10  Supine hip flexor stretch x 1 min    Standing lumbar extension 2x10  Standing hip extension and abduction 2x10 RTB  Sit to stand 2 x 10- NP  Step ups 2x10  Gastroc stretch 3 x 30 sec - NP   Mini squats 2x10  Leg Press #100 3 x 10  Lumbar extension machine 100# 3x10 - NP     Home Exercises Provided and Patient Education Provided     Education provided:   - HEP    Written Home Exercises Provided: Patient instructed to cont prior HEP.  Exercises were reviewed and Fox was able to demonstrate them prior to the end of the session.  Fox demonstrated good  understanding of the education provided.     See EMR under Media for exercises provided 10/6/2020.    Assessment     Pt with minimal pain at this time and improving muscle strength, endurance and functional endurance. He will benefit from continued performance of his HEP.   Fox is " progressing well towards his goals.   Pt prognosis is Excellent.     Pt will continue to benefit from skilled outpatient physical therapy to address the deficits listed in the problem list box on initial evaluation, provide pt/family education and to maximize pt's level of independence in the home and community environment.     Pt's spiritual, cultural and educational needs considered and pt agreeable to plan of care and goals.    Anticipated barriers to physical therapy: none    Goals:     Short Term Goals: 3 weeks   1) Pt will be I with HEP   2) Pt will have pain no worse then 2/10 during ADL activities - 10/22     Long Term Goals: 6 weeks   1) Patient will ascend/descend 2 flights of stairs without limitations -   2) Pt will perform all ADL activities without limitations related to lumbar pain - met 10/22         Plan     Place on hold until MD follow up     Plan of care Certification: 9/30/2020 to 11/11/20.       Dany Waller, PT

## 2020-10-29 ENCOUNTER — OFFICE VISIT (OUTPATIENT)
Dept: PAIN MEDICINE | Facility: CLINIC | Age: 70
End: 2020-10-29
Payer: MEDICARE

## 2020-10-29 VITALS
HEIGHT: 65 IN | SYSTOLIC BLOOD PRESSURE: 136 MMHG | DIASTOLIC BLOOD PRESSURE: 62 MMHG | TEMPERATURE: 97 F | BODY MASS INDEX: 39.72 KG/M2 | WEIGHT: 238.44 LBS | HEART RATE: 63 BPM | OXYGEN SATURATION: 97 %

## 2020-10-29 DIAGNOSIS — M51.36 DDD (DEGENERATIVE DISC DISEASE), LUMBAR: ICD-10-CM

## 2020-10-29 DIAGNOSIS — M54.16 LUMBAR RADICULOPATHY: Primary | ICD-10-CM

## 2020-10-29 PROCEDURE — 99999 PR PBB SHADOW E&M-EST. PATIENT-LVL IV: CPT | Mod: PBBFAC,,, | Performed by: PHYSICIAN ASSISTANT

## 2020-10-29 PROCEDURE — 99213 OFFICE O/P EST LOW 20 MIN: CPT | Mod: S$PBB,,, | Performed by: PHYSICIAN ASSISTANT

## 2020-10-29 PROCEDURE — 99999 PR PBB SHADOW E&M-EST. PATIENT-LVL IV: ICD-10-PCS | Mod: PBBFAC,,, | Performed by: PHYSICIAN ASSISTANT

## 2020-10-29 PROCEDURE — 99213 PR OFFICE/OUTPT VISIT, EST, LEVL III, 20-29 MIN: ICD-10-PCS | Mod: S$PBB,,, | Performed by: PHYSICIAN ASSISTANT

## 2020-10-29 PROCEDURE — 99214 OFFICE O/P EST MOD 30 MIN: CPT | Mod: PBBFAC,PN | Performed by: PHYSICIAN ASSISTANT
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
## 2020-12-21 ENCOUNTER — PATIENT MESSAGE (OUTPATIENT)
Dept: OTHER | Facility: OTHER | Age: 70
End: 2020-12-21

## 2021-01-22 ENCOUNTER — PATIENT MESSAGE (OUTPATIENT)
Dept: ADMINISTRATIVE | Facility: OTHER | Age: 71
End: 2021-01-22

## 2021-07-06 ENCOUNTER — TELEPHONE (OUTPATIENT)
Dept: PODIATRY | Facility: CLINIC | Age: 71
End: 2021-07-06

## 2021-07-09 ENCOUNTER — OFFICE VISIT (OUTPATIENT)
Dept: PODIATRY | Facility: CLINIC | Age: 71
End: 2021-07-09
Payer: MEDICARE

## 2021-07-09 VITALS
WEIGHT: 238.56 LBS | SYSTOLIC BLOOD PRESSURE: 121 MMHG | DIASTOLIC BLOOD PRESSURE: 65 MMHG | BODY MASS INDEX: 39.75 KG/M2 | HEART RATE: 60 BPM | HEIGHT: 65 IN

## 2021-07-09 DIAGNOSIS — B35.1 ONYCHOMYCOSIS DUE TO DERMATOPHYTE: Primary | ICD-10-CM

## 2021-07-09 PROCEDURE — 99999 PR PBB SHADOW E&M-EST. PATIENT-LVL IV: CPT | Mod: PBBFAC,,, | Performed by: PODIATRIST

## 2021-07-09 PROCEDURE — 99214 OFFICE O/P EST MOD 30 MIN: CPT | Mod: PBBFAC,PN | Performed by: PODIATRIST

## 2021-07-09 PROCEDURE — 99213 OFFICE O/P EST LOW 20 MIN: CPT | Mod: S$PBB,,, | Performed by: PODIATRIST

## 2021-07-09 PROCEDURE — 99213 PR OFFICE/OUTPT VISIT, EST, LEVL III, 20-29 MIN: ICD-10-PCS | Mod: S$PBB,,, | Performed by: PODIATRIST

## 2021-07-09 PROCEDURE — 99999 PR PBB SHADOW E&M-EST. PATIENT-LVL IV: ICD-10-PCS | Mod: PBBFAC,,, | Performed by: PODIATRIST

## 2021-07-13 PROBLEM — R53.1 WEAKNESS: Status: RESOLVED | Noted: 2020-09-30 | Resolved: 2021-07-13

## 2022-05-03 PROBLEM — E66.01 SEVERE OBESITY (BMI 35.0-39.9) WITH COMORBIDITY: Status: ACTIVE | Noted: 2022-05-03

## 2022-05-03 PROBLEM — I50.30 DIASTOLIC CONGESTIVE HEART FAILURE, UNSPECIFIED HF CHRONICITY: Status: ACTIVE | Noted: 2022-05-03

## 2022-05-04 ENCOUNTER — TELEPHONE (OUTPATIENT)
Dept: PODIATRY | Facility: CLINIC | Age: 72
End: 2022-05-04
Payer: MEDICARE

## 2022-05-05 ENCOUNTER — TELEPHONE (OUTPATIENT)
Dept: PODIATRY | Facility: CLINIC | Age: 72
End: 2022-05-05
Payer: MEDICARE

## 2022-05-05 ENCOUNTER — OFFICE VISIT (OUTPATIENT)
Dept: PODIATRY | Facility: CLINIC | Age: 72
End: 2022-05-05
Payer: MEDICARE

## 2022-05-05 VITALS — BODY MASS INDEX: 38.82 KG/M2 | WEIGHT: 233 LBS | HEIGHT: 65 IN

## 2022-05-05 DIAGNOSIS — L60.0 INGROWN TOENAIL OF LEFT FOOT: Primary | ICD-10-CM

## 2022-05-05 DIAGNOSIS — M79.675 TOE PAIN, LEFT: ICD-10-CM

## 2022-05-05 PROCEDURE — 99999 PR PBB SHADOW E&M-EST. PATIENT-LVL III: ICD-10-PCS | Mod: PBBFAC,,, | Performed by: PODIATRIST

## 2022-05-05 PROCEDURE — 99213 OFFICE O/P EST LOW 20 MIN: CPT | Mod: PBBFAC,PN | Performed by: PODIATRIST

## 2022-05-05 PROCEDURE — 99213 OFFICE O/P EST LOW 20 MIN: CPT | Mod: S$PBB,,, | Performed by: PODIATRIST

## 2022-05-05 PROCEDURE — 99999 PR PBB SHADOW E&M-EST. PATIENT-LVL III: CPT | Mod: PBBFAC,,, | Performed by: PODIATRIST

## 2022-05-05 PROCEDURE — 99213 PR OFFICE/OUTPT VISIT, EST, LEVL III, 20-29 MIN: ICD-10-PCS | Mod: S$PBB,,, | Performed by: PODIATRIST

## 2022-05-05 NOTE — TELEPHONE ENCOUNTER
----- Message from Venessa Fuentes sent at 5/5/2022  8:34 AM CDT -----  Contact: PT  Type: Needs Medical Advice    Who Called:PT  Best Call Back Number: [phone]  Additional  Information: Pt would like a call back to move forward with scheduling procedure Partial Matrixectomy  Please Advise- Thank you
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
## 2022-05-05 NOTE — PATIENT INSTRUCTIONS
Recommend applying vaseline, once daily, to the inside edge of the nail and adjacent skin.    If this fails to soften the nail and adjacent nail fold after one month of solid application, I would recommend a partial matrixectomy of said border of nail.

## 2022-05-16 ENCOUNTER — OFFICE VISIT (OUTPATIENT)
Dept: PODIATRY | Facility: CLINIC | Age: 72
End: 2022-05-16
Payer: MEDICARE

## 2022-05-16 DIAGNOSIS — L60.0 INGROWN TOENAIL OF LEFT FOOT: Primary | ICD-10-CM

## 2022-05-16 DIAGNOSIS — M79.675 TOE PAIN, LEFT: ICD-10-CM

## 2022-05-16 PROCEDURE — 99499 UNLISTED E&M SERVICE: CPT | Mod: S$PBB,,, | Performed by: PODIATRIST

## 2022-05-16 PROCEDURE — 11730 NAIL REMOVAL: ICD-10-PCS | Mod: TA,S$PBB,, | Performed by: PODIATRIST

## 2022-05-16 PROCEDURE — 99211 OFF/OP EST MAY X REQ PHY/QHP: CPT | Mod: PBBFAC,PN | Performed by: PODIATRIST

## 2022-05-16 PROCEDURE — 99999 PR PBB SHADOW E&M-EST. PATIENT-LVL I: CPT | Mod: PBBFAC,,, | Performed by: PODIATRIST

## 2022-05-16 PROCEDURE — 11730 AVULSION NAIL PLATE SIMPLE 1: CPT | Mod: TA,PBBFAC,PN | Performed by: PODIATRIST

## 2022-05-16 PROCEDURE — 99499 NO LOS: ICD-10-PCS | Mod: S$PBB,,, | Performed by: PODIATRIST

## 2022-05-16 PROCEDURE — 99999 PR PBB SHADOW E&M-EST. PATIENT-LVL I: ICD-10-PCS | Mod: PBBFAC,,, | Performed by: PODIATRIST

## 2022-05-16 RX ORDER — MUPIROCIN 20 MG/G
OINTMENT TOPICAL 3 TIMES DAILY
Qty: 30 G | Refills: 1 | Status: SHIPPED | OUTPATIENT
Start: 2022-05-16 | End: 2023-08-01 | Stop reason: ALTCHOICE
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
## 2022-05-16 NOTE — PROCEDURES
Nail Removal    Date/Time: 5/16/2022 8:00 AM  Performed by: Jigar Vinson DPM  Authorized by: Jigar Vinson DPM     Consent Done?:  Yes (Written)  Time out: Immediately prior to the procedure a time out was called    Prep: patient was prepped and draped in usual sterile fashion    Location:     Location:  Left foot    Location detail:  Left big toe  Anesthesia:     Anesthesia:  Digital block    Local anesthetic:  Lidocaine 1% without epinephrine    Anesthetic total (ml):  4  Procedure Details:     Preparation:  Skin prepped with alcohol and skin prepped with Betadine    Amount removed:  Partial    Side:  Medial    Wedge excision of skin of nail fold: No      Nail bed sutured?: No      Nail matrix removed:  None    Removed nail replaced and anchored: No      Dressing applied:  4x4, dressing applied and antibiotic ointment    Patient tolerance:  Patient tolerated the procedure well with no immediate complications     Applied three 30 second applications of phenol.

## 2022-05-16 NOTE — PATIENT INSTRUCTIONS
"POST NAIL PROCEDURE INSTRUCTIONS    1. Leave bandage intact for 12-24 hours. If the bandage sticks as you try to remove it, soak it in warm water until it lifts off.    2. Wash the toe with antibacterial soap and water separate from the body.      3. Dry foot completely after soaking, and apply a small amount of bactroban and a fabric or cloth bandaid ("plastic" bandaids tend to lift off with ointment use).  Wear open-toed shoes as needed for comfort.     4. Take Tylenol as needed for pain.     5. Your toe may drain for the next few days. Normal drainage is yellow-to-pink, and clear, much like the fluid in a blister. Watch for redness spreading up your toe into your foot, white thick drainage (pus), pain unrelieved by medication, or nausea/vomiting/fever/chills. These are signs of infection. Please call the clinic or visit your doctor.      "

## 2022-05-23 ENCOUNTER — OFFICE VISIT (OUTPATIENT)
Dept: PODIATRY | Facility: CLINIC | Age: 72
End: 2022-05-23
Payer: MEDICARE

## 2022-05-23 VITALS — WEIGHT: 233 LBS | BODY MASS INDEX: 38.82 KG/M2 | HEIGHT: 65 IN

## 2022-05-23 DIAGNOSIS — Z98.890 POSTOPERATIVE STATE: Primary | ICD-10-CM

## 2022-05-23 DIAGNOSIS — Z98.890 STATUS POST NAIL SURGERY: ICD-10-CM

## 2022-05-23 PROCEDURE — 99999 PR PBB SHADOW E&M-EST. PATIENT-LVL II: ICD-10-PCS | Mod: PBBFAC,,, | Performed by: PODIATRIST

## 2022-05-23 PROCEDURE — 99999 PR PBB SHADOW E&M-EST. PATIENT-LVL II: CPT | Mod: PBBFAC,,, | Performed by: PODIATRIST

## 2022-05-23 PROCEDURE — 99212 OFFICE O/P EST SF 10 MIN: CPT | Mod: PBBFAC,PN | Performed by: PODIATRIST

## 2022-05-23 PROCEDURE — 99024 POSTOP FOLLOW-UP VISIT: CPT | Mod: POP,,, | Performed by: PODIATRIST

## 2022-05-23 PROCEDURE — 99024 PR POST-OP FOLLOW-UP VISIT: ICD-10-PCS | Mod: POP,,, | Performed by: PODIATRIST
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
## 2022-10-19 ENCOUNTER — IMMUNIZATION (OUTPATIENT)
Dept: PHARMACY | Facility: CLINIC | Age: 72
End: 2022-10-19
Payer: MEDICARE

## 2022-10-19 DIAGNOSIS — Z23 NEED FOR VACCINATION: Primary | ICD-10-CM

## 2022-12-28 PROBLEM — I25.10 MILD CAD: Status: ACTIVE | Noted: 2022-12-28

## 2023-01-25 ENCOUNTER — TELEPHONE (OUTPATIENT)
Dept: GASTROENTEROLOGY | Facility: CLINIC | Age: 73
End: 2023-01-25
Payer: MEDICARE

## 2023-01-25 NOTE — TELEPHONE ENCOUNTER
Returned call to pt. Scheduled c-scope per pt and pts wife request. Confirmation letter mailed to pt. Pt and wife verbalized understanding

## 2023-02-14 ENCOUNTER — TELEPHONE (OUTPATIENT)
Dept: GASTROENTEROLOGY | Facility: CLINIC | Age: 73
End: 2023-02-14
Payer: MEDICARE

## 2023-02-14 NOTE — TELEPHONE ENCOUNTER
----- Message from Niru Jackson sent at 2/14/2023 10:10 AM CST -----  Name of Who is Calling:CHRIS LUDWIG [[id number]], Apolonia wife        What is the request in detail: pt wife has questions regarding procedure prep, please advise        Can the clinic reply by [facility]:       [facility][phone]

## 2023-02-14 NOTE — TELEPHONE ENCOUNTER
Returned call to the patient's wife, prep instructions discussed, prep instructions sent to my chart, patient verbalized understanding of this.

## 2023-02-16 ENCOUNTER — TELEPHONE (OUTPATIENT)
Dept: GASTROENTEROLOGY | Facility: CLINIC | Age: 73
End: 2023-02-16
Payer: MEDICARE

## 2023-02-16 NOTE — TELEPHONE ENCOUNTER
----- Message from Natalia Washington sent at 2/16/2023 10:23 AM CST -----  Contact: [phone]  Type: Needs Medical Advice  Who Called:  Pts wife Apolonia Nichole Call Back Number: [phone]  Additional Information: Pts wife requesting  call back to ask about pts medications he should stop before his procedure in April., She also would like to come by and  prep instructions. Pls call back and advise

## 2023-02-16 NOTE — TELEPHONE ENCOUNTER
Returned call to patient's wife, medications discussed, Mrs Barksdale verbalized understanding of this.

## 2023-04-10 ENCOUNTER — TELEPHONE (OUTPATIENT)
Dept: GASTROENTEROLOGY | Facility: CLINIC | Age: 73
End: 2023-04-10
Payer: MEDICARE

## 2023-04-14 ENCOUNTER — TELEPHONE (OUTPATIENT)
Dept: GASTROENTEROLOGY | Facility: CLINIC | Age: 73
End: 2023-04-14
Payer: MEDICARE

## 2023-04-14 NOTE — TELEPHONE ENCOUNTER
Returned call to the patient's wife, prep instructions and medications discussed as well as given the number to the surgery center, Patient's wife verbalized understanding of this.

## 2023-04-14 NOTE — TELEPHONE ENCOUNTER
----- Message from He Vieira sent at 4/14/2023 11:05 AM CDT -----  Contact: Wife  Type: Needs Medical Advice  Who Called:  Wife    Best Call Back Number: [phone]    Additional Information: Pt wife states she would like to speak to office regarding procedure time on 4/19 and also if he need to take bp medication before.Please call back

## 2023-04-16 PROBLEM — M54.50 LUMBAR PAIN: Status: RESOLVED | Noted: 2020-09-30 | Resolved: 2023-04-16

## 2023-04-16 PROBLEM — M54.16 LUMBAR RADICULOPATHY: Status: RESOLVED | Noted: 2020-09-29 | Resolved: 2023-04-16

## 2023-04-16 PROBLEM — I50.30 DIASTOLIC CONGESTIVE HEART FAILURE, UNSPECIFIED HF CHRONICITY: Status: RESOLVED | Noted: 2022-05-03 | Resolved: 2023-04-16

## 2023-04-16 PROBLEM — I48.91 A-FIB: Status: ACTIVE | Noted: 2023-04-16

## 2023-04-21 PROBLEM — I48.0 PAF (PAROXYSMAL ATRIAL FIBRILLATION): Status: ACTIVE | Noted: 2023-04-21

## 2023-04-21 PROBLEM — Z79.01 CHRONIC ANTICOAGULATION: Status: ACTIVE | Noted: 2023-04-21

## 2023-04-24 ENCOUNTER — TELEPHONE (OUTPATIENT)
Dept: GASTROENTEROLOGY | Facility: CLINIC | Age: 73
End: 2023-04-24
Payer: MEDICARE

## 2023-04-24 NOTE — TELEPHONE ENCOUNTER
Returned call to the patient's wife, Mrs Barksdale.  She reports that the patient recently had an episode of A Fib requiring a prescription of Eliquis to be added to his medication regime.  Mrs Barksdale was notified that we will need a clearance from his cardiologist to be able to perform a procedure on him due to the recent cardiac event as he will need to hold the eliquis 2 days in preparation of the colonoscopy in case biopsies are performed which could possibly cause excess bleeding after the procedure.  Mrs Barksdale verbalized understanding of this and states that she will wait until after patient's 6 week f/u with Dr. Workman and call us back.

## 2023-04-24 NOTE — TELEPHONE ENCOUNTER
----- Message from Melinda Espinosa sent at 4/24/2023  3:19 PM CDT -----  Contact: patient wife  Type:  Apoointment Request    Name of Caller: Patient's wife, Apolonia     When is the first available appointment?n/a     Symptoms:colonoscopy     Would the patient rather a call back or a response via [facility]? Call     Best Call Back Number: [phone]    Additional Information:

## 2023-08-01 PROBLEM — I20.89 OTHER FORMS OF ANGINA PECTORIS: Status: ACTIVE | Noted: 2023-08-01

## 2023-08-01 PROBLEM — D69.2 OTHER NONTHROMBOCYTOPENIC PURPURA: Status: ACTIVE | Noted: 2023-08-01

## 2023-08-14 ENCOUNTER — TELEPHONE (OUTPATIENT)
Dept: OTOLARYNGOLOGY | Facility: CLINIC | Age: 73
End: 2023-08-14
Payer: MEDICARE

## 2023-08-14 NOTE — TELEPHONE ENCOUNTER
----- Message from Shahida Hicks sent at 8/14/2023  4:35 PM CDT -----  Type:  Sooner Appointment Request    Caller is requesting a sooner appointment.  Caller declined first available appointment listed below.  Caller will not accept being placed on the waitlist and is requesting a message be sent to doctor.    Name of Caller:  pt   When is the first available appointment?  Oct 23  Symptoms:  ear fullness   Best Call Back Number:  [phone]    Additional Information:  pt wants to be seen with wife sometime in sept please advise '

## 2023-08-15 ENCOUNTER — OFFICE VISIT (OUTPATIENT)
Dept: OTOLARYNGOLOGY | Facility: CLINIC | Age: 73
End: 2023-08-15
Payer: MEDICARE

## 2023-08-15 VITALS — WEIGHT: 224 LBS | BODY MASS INDEX: 37.32 KG/M2 | HEIGHT: 65 IN

## 2023-08-15 DIAGNOSIS — H61.22 IMPACTED CERUMEN OF LEFT EAR: Primary | ICD-10-CM

## 2023-08-15 DIAGNOSIS — H61.22 HEARING LOSS OF LEFT EAR DUE TO CERUMEN IMPACTION: ICD-10-CM

## 2023-08-15 PROCEDURE — 99999 PR PBB SHADOW E&M-EST. PATIENT-LVL III: ICD-10-PCS | Mod: PBBFAC,,, | Performed by: NURSE PRACTITIONER

## 2023-08-15 PROCEDURE — 99499 UNLISTED E&M SERVICE: CPT | Mod: S$PBB,,, | Performed by: NURSE PRACTITIONER

## 2023-08-15 PROCEDURE — 99999 PR PBB SHADOW E&M-EST. PATIENT-LVL III: CPT | Mod: PBBFAC,,, | Performed by: NURSE PRACTITIONER

## 2023-08-15 PROCEDURE — 99499 NO LOS: ICD-10-PCS | Mod: S$PBB,,, | Performed by: NURSE PRACTITIONER

## 2023-08-15 PROCEDURE — 99213 OFFICE O/P EST LOW 20 MIN: CPT | Mod: 25,PBBFAC,PO | Performed by: NURSE PRACTITIONER

## 2023-08-15 PROCEDURE — 69210 REMOVE IMPACTED EAR WAX UNI: CPT | Mod: PBBFAC,PO,LT | Performed by: NURSE PRACTITIONER

## 2023-08-15 PROCEDURE — 69210 PR REMOVAL IMPACTED CERUMEN REQUIRING INSTRUMENTATION, UNILATERAL: ICD-10-PCS | Mod: S$PBB,,, | Performed by: NURSE PRACTITIONER

## 2023-08-15 PROCEDURE — 69210 REMOVE IMPACTED EAR WAX UNI: CPT | Mod: S$PBB,,, | Performed by: NURSE PRACTITIONER

## 2023-08-15 NOTE — PROGRESS NOTES
Subjective     Patient ID: Fox Bell is a 72 y.o. male.    Chief Complaint: Cerumen Impaction (Left)    HPI  Patient is new, self-referred for left cerumen impaction. Patient states he has never had his ears cleaned before. He suspects left cerumen impaction. Muffled hearing AS. Otherwise no other current ENT concerns.     Review of Systems   Constitutional: Negative.    HENT: Negative.     Eyes: Negative.    Respiratory: Negative.     Cardiovascular: Negative.    Gastrointestinal: Negative.    Musculoskeletal: Negative.    Integumentary:  Negative.   Neurological: Negative.    Hematological: Negative.    Psychiatric/Behavioral: Negative.          Objective     Physical Exam  Vitals and nursing note reviewed.   Constitutional:       General: He is not in acute distress.     Appearance: He is well-developed. He is not ill-appearing.   HENT:      Head: Normocephalic and atraumatic.      Right Ear: Hearing, tympanic membrane, ear canal and external ear normal. No middle ear effusion. Tympanic membrane is not erythematous.      Left Ear: Hearing, tympanic membrane, ear canal and external ear normal.  No middle ear effusion. Tympanic membrane is not erythematous.      Nose: Nose normal.   Eyes:      General: Lids are normal. No scleral icterus.        Right eye: No discharge.         Left eye: No discharge.   Neck:      Trachea: Trachea normal. No tracheal deviation.   Cardiovascular:      Rate and Rhythm: Normal rate.   Pulmonary:      Effort: Pulmonary effort is normal. No respiratory distress.      Breath sounds: No stridor. No wheezing.   Musculoskeletal:         General: Normal range of motion.      Cervical back: Normal range of motion.   Skin:     General: Skin is warm and dry.   Neurological:      Mental Status: He is alert and oriented to person, place, and time.      Coordination: Coordination is intact.      Gait: Gait normal.   Psychiatric:         Attention and Perception: Attention normal.         Mood  and Affect: Mood normal.         Speech: Speech normal.         Behavior: Behavior normal. Behavior is cooperative.     SEPARATE PROCEDURE IN OFFICE:   Procedure: Removal of impacted cerumen, LEFT  Pre Procedure Diagnosis: Cerumen Impaction   Post Procedure Diagnosis: Cerumen Impaction   Verbal informed consent in regards to risk of trauma to ear canal, ear drum or hearing, discomfort during procedure and/or inability to remove cerumen impaction in one session or unforeseen events or complications.   No anesthesia.     Procedure in detail:   Ear canal visualized bilateral with appropriate size ear speculum utilizing Operating Head Binocular Otomicroscope   Utilizing the following:  Ring curet, delicate alligator forceps, and/or suction cannula was used. The impacted cerumen of the ear canals was removed atraumatically. The TM and EAC were then inspected and found to be clear of wax. See description of TMs/EACs in PE above.   Complications: No   Condition: Improved/Good       Assessment and Plan     1. Impacted cerumen of left ear    2. Hearing loss of left ear due to cerumen impaction      Debrox weekly. Patient encouraged to return to clinic if symptoms worsen/persist and as needed for further ENT symptoms or concerns.          No follow-ups on file.

## 2023-08-18 ENCOUNTER — CLINICAL SUPPORT (OUTPATIENT)
Dept: AUDIOLOGY | Facility: CLINIC | Age: 73
End: 2023-08-18
Payer: MEDICARE

## 2023-08-18 DIAGNOSIS — H90.3 BILATERAL HIGH FREQUENCY SENSORINEURAL HEARING LOSS: Primary | ICD-10-CM

## 2023-08-18 PROCEDURE — 92567 TYMPANOMETRY: CPT | Mod: PBBFAC,PO | Performed by: AUDIOLOGIST

## 2023-08-18 PROCEDURE — 92557 COMPREHENSIVE HEARING TEST: CPT | Mod: PBBFAC,PO | Performed by: AUDIOLOGIST

## 2023-08-18 NOTE — Clinical Note
Agnes,   We will send this pt a recall letter to monitor high frequency asymmetry with annual audio.   Thanks,  Akiko

## 2023-08-18 NOTE — PROGRESS NOTES
Fox Bell was seen 08/18/2023 for an audiological evaluation.      Pt reported difficulty hearing speech clearly at times in restaurants.  No tinnitus.      Otoscopy revealed clear view of both external ear canals and tympanic membranes.  No obstructive cerumen present in either ear canal.       Audiogram results revealed a mild-to-moderate sensorineural hearing loss for the right ear and a mild-to-severe sensorineural hearing loss for the left ear.  Speech Reception Thresholds were 15 dBHL for the right ear and 15 dBHL for the left ear.  Word recognition scores were excellent for the right ear and excellent for the left ear.   Tympanograms were Type A for the right ear and Type A for the left ear.    Audiogram results were reviewed in detail with patient and all questions were answered. Results will be reviewed by ENT and/or referring provider at the completion of this note.      Recommend binaural amplification pending medical clearance, hearing protection for all loud sounds and annual audiogram to monitor hearing loss.

## 2023-09-15 ENCOUNTER — TELEPHONE (OUTPATIENT)
Dept: OTOLARYNGOLOGY | Facility: CLINIC | Age: 73
End: 2023-09-15
Payer: MEDICARE

## 2023-09-15 NOTE — TELEPHONE ENCOUNTER
----- Message from Celeste Fuentes sent at 9/15/2023  2:11 PM CDT -----  Regarding: ear problem  Contact: Apolonia  Type:  Needs Medical Advice    Who Called: Apolonia  Would the patient rather a call back or a response via [facility]? Call back  Best Call Back Number: [phone]    Additional Information: sts he would like to speak to Daija regarding what is going on with his ear--please advise and thank you

## 2023-09-19 ENCOUNTER — OFFICE VISIT (OUTPATIENT)
Dept: OTOLARYNGOLOGY | Facility: CLINIC | Age: 73
End: 2023-09-19
Payer: MEDICARE

## 2023-09-19 VITALS — BODY MASS INDEX: 37.03 KG/M2 | WEIGHT: 222.25 LBS | HEIGHT: 65 IN

## 2023-09-19 DIAGNOSIS — H61.22 IMPACTED CERUMEN OF LEFT EAR: ICD-10-CM

## 2023-09-19 DIAGNOSIS — H60.312 ACUTE DIFFUSE OTITIS EXTERNA OF LEFT EAR: Primary | ICD-10-CM

## 2023-09-19 DIAGNOSIS — H61.22 HEARING LOSS OF LEFT EAR DUE TO CERUMEN IMPACTION: ICD-10-CM

## 2023-09-19 PROCEDURE — 99999PBSHW PR PBB SHADOW TECHNICAL ONLY FILED TO HB: ICD-10-PCS | Mod: PBBFAC,,,

## 2023-09-19 PROCEDURE — 99214 PR OFFICE/OUTPT VISIT, EST, LEVL IV, 30-39 MIN: ICD-10-PCS | Mod: 25,S$PBB,, | Performed by: NURSE PRACTITIONER

## 2023-09-19 PROCEDURE — 87070 CULTURE OTHR SPECIMN AEROBIC: CPT | Performed by: NURSE PRACTITIONER

## 2023-09-19 PROCEDURE — 99999PBSHW PR PBB SHADOW TECHNICAL ONLY FILED TO HB: Mod: PBBFAC,,,

## 2023-09-19 PROCEDURE — 69210 REMOVE IMPACTED EAR WAX UNI: CPT | Mod: S$PBB,,, | Performed by: NURSE PRACTITIONER

## 2023-09-19 PROCEDURE — 99214 OFFICE O/P EST MOD 30 MIN: CPT | Mod: 25,S$PBB,, | Performed by: NURSE PRACTITIONER

## 2023-09-19 PROCEDURE — 99999 PR PBB SHADOW E&M-EST. PATIENT-LVL III: CPT | Mod: PBBFAC,,, | Performed by: NURSE PRACTITIONER

## 2023-09-19 PROCEDURE — 69210 REMOVE IMPACTED EAR WAX UNI: CPT | Mod: PBBFAC,PO,LT | Performed by: NURSE PRACTITIONER

## 2023-09-19 PROCEDURE — 99999 PR PBB SHADOW E&M-EST. PATIENT-LVL III: ICD-10-PCS | Mod: PBBFAC,,, | Performed by: NURSE PRACTITIONER

## 2023-09-19 PROCEDURE — 69210 PR REMOVAL IMPACTED CERUMEN REQUIRING INSTRUMENTATION, UNILATERAL: ICD-10-PCS | Mod: S$PBB,,, | Performed by: NURSE PRACTITIONER

## 2023-09-19 PROCEDURE — 99070 SPECIAL SUPPLIES PHYS/QHP: CPT | Mod: PBBFAC,PO | Performed by: NURSE PRACTITIONER

## 2023-09-19 PROCEDURE — 99213 OFFICE O/P EST LOW 20 MIN: CPT | Mod: 25,PBBFAC,PO | Performed by: NURSE PRACTITIONER

## 2023-09-19 RX ORDER — CLOTRIMAZOLE 1 G/ML
SOLUTION TOPICAL 2 TIMES DAILY
Qty: 20 ML | Refills: 0 | Status: SHIPPED | OUTPATIENT
Start: 2023-09-19 | End: 2024-02-23

## 2023-09-21 ENCOUNTER — TELEPHONE (OUTPATIENT)
Dept: OTOLARYNGOLOGY | Facility: CLINIC | Age: 73
End: 2023-09-21
Payer: MEDICARE

## 2023-09-21 NOTE — TELEPHONE ENCOUNTER
----- Message from Agnes Cha NP sent at 9/21/2023 10:50 AM CDT -----  Fungus in ear. Continue anti-fungal Clotrimazole twice daily for at least 3 weeks. Return for ear recheck to ensure improvement.

## 2023-09-24 LAB — BACTERIA SPEC AEROBE CULT: ABNORMAL

## 2023-10-10 ENCOUNTER — OFFICE VISIT (OUTPATIENT)
Dept: OTOLARYNGOLOGY | Facility: CLINIC | Age: 73
End: 2023-10-10
Payer: MEDICARE

## 2023-10-10 VITALS — HEIGHT: 65 IN | BODY MASS INDEX: 37.2 KG/M2 | WEIGHT: 223.31 LBS

## 2023-10-10 DIAGNOSIS — B44.89: Primary | ICD-10-CM

## 2023-10-10 DIAGNOSIS — H61.22 IMPACTED CERUMEN OF LEFT EAR: ICD-10-CM

## 2023-10-10 PROCEDURE — 99999 PR PBB SHADOW E&M-EST. PATIENT-LVL III: ICD-10-PCS | Mod: PBBFAC,,, | Performed by: NURSE PRACTITIONER

## 2023-10-10 PROCEDURE — 99999 PR PBB SHADOW E&M-EST. PATIENT-LVL III: CPT | Mod: PBBFAC,,, | Performed by: NURSE PRACTITIONER

## 2023-10-10 PROCEDURE — 69210 REMOVE IMPACTED EAR WAX UNI: CPT | Mod: S$PBB,,, | Performed by: NURSE PRACTITIONER

## 2023-10-10 PROCEDURE — 99213 OFFICE O/P EST LOW 20 MIN: CPT | Mod: 25,S$PBB,, | Performed by: NURSE PRACTITIONER

## 2023-10-10 PROCEDURE — 69210 PR REMOVAL IMPACTED CERUMEN REQUIRING INSTRUMENTATION, UNILATERAL: ICD-10-PCS | Mod: S$PBB,,, | Performed by: NURSE PRACTITIONER

## 2023-10-10 PROCEDURE — 99213 PR OFFICE/OUTPT VISIT, EST, LEVL III, 20-29 MIN: ICD-10-PCS | Mod: 25,S$PBB,, | Performed by: NURSE PRACTITIONER

## 2023-10-10 PROCEDURE — 69210 REMOVE IMPACTED EAR WAX UNI: CPT | Mod: PBBFAC,PO | Performed by: NURSE PRACTITIONER

## 2023-10-10 PROCEDURE — 99213 OFFICE O/P EST LOW 20 MIN: CPT | Mod: PBBFAC,PO | Performed by: NURSE PRACTITIONER

## 2023-10-10 NOTE — PROGRESS NOTES
Subjective     Patient ID: Fox Bell is a 73 y.o. male.    Chief Complaint: No chief complaint on file.    HPI  Patient seen 08/15/2023 for left cerumen impaction and muffled hearing AS. Patient seen 09/19/2023 for left OE (+) Aspergillus flavus. Treated with Clotrimazole gtts. Patient reports ears feel fine. No fullness. Some achiness behind right ear lobe which he suspected might be his TMJ flaring up. He has been keeping his ears dry.     Review of Systems   Constitutional: Negative.    HENT: Negative.     Eyes: Negative.    Respiratory: Negative.     Cardiovascular: Negative.    Gastrointestinal: Negative.    Musculoskeletal: Negative.    Integumentary:  Negative.   Neurological: Negative.    Hematological: Negative.    Psychiatric/Behavioral: Negative.            Objective     Physical Exam  Vitals and nursing note reviewed.   Constitutional:       General: He is not in acute distress.     Appearance: He is well-developed. He is not ill-appearing.   HENT:      Head: Normocephalic and atraumatic.      Right Ear: Hearing, tympanic membrane, ear canal and external ear normal. No middle ear effusion. Tympanic membrane is not erythematous.      Left Ear: Hearing, tympanic membrane, ear canal and external ear normal.  No middle ear effusion. Tympanic membrane is not erythematous.      Nose: Nose normal.   Eyes:      General: Lids are normal. No scleral icterus.        Right eye: No discharge.         Left eye: No discharge.   Neck:      Trachea: Trachea normal. No tracheal deviation.   Cardiovascular:      Rate and Rhythm: Normal rate.   Pulmonary:      Effort: Pulmonary effort is normal. No respiratory distress.      Breath sounds: No stridor. No wheezing.   Musculoskeletal:         General: Normal range of motion.      Cervical back: Normal range of motion.   Skin:     General: Skin is warm and dry.   Neurological:      Mental Status: He is alert and oriented to person, place, and time.      Coordination:  Coordination is intact.      Gait: Gait normal.   Psychiatric:         Attention and Perception: Attention normal.         Mood and Affect: Mood normal.         Speech: Speech normal.         Behavior: Behavior normal. Behavior is cooperative.     SEPARATE PROCEDURE IN OFFICE:   Procedure: Removal of impacted cerumen, LEFT   Pre Procedure Diagnosis: Cerumen Impaction   Post Procedure Diagnosis: Cerumen Impaction   Verbal informed consent in regards to risk of trauma to ear canal, ear drum or hearing, discomfort during procedure and/or inability to remove cerumen impaction in one session or unforeseen events or complications.   No anesthesia.     Procedure in detail:   Ear canal visualized bilateral with appropriate size ear speculum utilizing Operating Head Binocular Otomicroscope   Utilizing the following:  suction cannula was used AS. The impacted cerumen of the ear canals was removed atraumatically. The TM and EAC were then inspected and found to be clear of wax. See description of TMs/EACs in PE above.   Complications: No   Condition: Improved/Good       Assessment and Plan     1. 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 Impacted cerumen of left ear      Continue clotrimazole solution to left ear BID X 30 days total.   Continue to keep ears dry.   Reassured right ear clear. Discussed TMJ  Patient encouraged to return to clinic if symptoms worsen/persist and as needed for further ENT symptoms or concerns.          No follow-ups on file.

## 2023-10-23 ENCOUNTER — TELEPHONE (OUTPATIENT)
Dept: GASTROENTEROLOGY | Facility: CLINIC | Age: 73
End: 2023-10-23
Payer: MEDICARE

## 2023-10-23 NOTE — TELEPHONE ENCOUNTER
----- Message from Sallie Vieira LPN sent at 10/23/2023 12:01 PM CDT -----    ----- Message -----  From: Shelia Kline  Sent: 10/23/2023  11:54 AM CDT  To: Junito WOODALL Jr. Staff    Type: Needs Medical Advice  Who Called:  Apolonia Spouse    Best Call Back Number: [phone]   Additional Information: Spouse called to [facility] colonoscopy but would like to speak to staff prior to scheduling  Please advise  Thanks

## 2023-12-13 ENCOUNTER — TELEPHONE (OUTPATIENT)
Dept: GASTROENTEROLOGY | Facility: CLINIC | Age: 73
End: 2023-12-13
Payer: MEDICARE

## 2023-12-13 PROBLEM — J20.9 ACUTE BRONCHITIS: Status: ACTIVE | Noted: 2023-12-13

## 2023-12-13 PROBLEM — M79.10 MYALGIA: Status: ACTIVE | Noted: 2023-12-13

## 2023-12-13 PROBLEM — M25.50 ARTHRALGIA: Status: ACTIVE | Noted: 2023-12-13

## 2024-01-03 NOTE — PLAN OF CARE
[facility] to get them in tomorrow at 10:45am with     VSS, all questions answered. Denies recent fever or illness. Pt states ready for procedure.

## 2024-01-04 ENCOUNTER — TELEPHONE (OUTPATIENT)
Dept: GASTROENTEROLOGY | Facility: CLINIC | Age: 74
End: 2024-01-04
Payer: MEDICARE

## 2024-01-04 NOTE — TELEPHONE ENCOUNTER
----- Message from Celeste Fuentes sent at 1/4/2024 10:17 AM CST -----  Regarding: colonoscopy  Contact: jeanette  Type:  Needs Medical Advice    Who Called: jeanette  Would the patient rather a call back or a response via [facility]? Call back  Best Call Back Number: [phone]     Additional Information: sts she would like to speak to Dagmar (his nurse)about his procedure

## 2024-01-04 NOTE — TELEPHONE ENCOUNTER
Returned call to the patient's wife, all prep instructions were discussed with her and she verbalized understanding of this.

## 2024-01-08 ENCOUNTER — OFFICE VISIT (OUTPATIENT)
Dept: NEUROLOGY | Facility: CLINIC | Age: 74
End: 2024-01-08
Payer: MEDICARE

## 2024-01-08 VITALS
HEART RATE: 59 BPM | SYSTOLIC BLOOD PRESSURE: 112 MMHG | WEIGHT: 229.25 LBS | DIASTOLIC BLOOD PRESSURE: 66 MMHG | HEIGHT: 65 IN | BODY MASS INDEX: 38.2 KG/M2

## 2024-01-08 DIAGNOSIS — R26.89 IMBALANCE: ICD-10-CM

## 2024-01-08 DIAGNOSIS — R25.1 TREMORS OF NERVOUS SYSTEM: Primary | ICD-10-CM

## 2024-01-08 DIAGNOSIS — R26.9 ABNORMALITY OF GAIT: ICD-10-CM

## 2024-01-08 DIAGNOSIS — E66.01 SEVERE OBESITY (BMI 35.0-39.9) WITH COMORBIDITY: ICD-10-CM

## 2024-01-08 PROCEDURE — 99999 PR PBB SHADOW E&M-EST. PATIENT-LVL V: CPT | Mod: PBBFAC,,, | Performed by: NURSE PRACTITIONER

## 2024-01-08 PROCEDURE — 99215 OFFICE O/P EST HI 40 MIN: CPT | Mod: PBBFAC,PO | Performed by: NURSE PRACTITIONER

## 2024-01-08 PROCEDURE — 99215 OFFICE O/P EST HI 40 MIN: CPT | Mod: S$PBB,,, | Performed by: NURSE PRACTITIONER

## 2024-01-08 NOTE — PROGRESS NOTES
"Name: Fox Bell  MRN: [id number]   [facility]: [id number]      Date: 1-8-24      Referring physician:  Connie Martin NP  [address] [place] [address]    Subjective:      Chief Complaint: tremors and gait    History of Present Illness (HPI):    Fox Bell is a 73 y.o. right-handed male with HTN, VIBHA, AFib(on Elaquis) who presents today for an initial evaluation of tremor and gait and is accompanied by wife, Apolonia.    Tremor long time- at least 8-10 years. It is getting worse.  Began in , sometimes LH seems to shake more than others.   Notices daily.   Does not stop from functioning.   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 Retired 5 years ago as it was time not because of tremor.   Does not notice tremor with eating/drinking.   Will note tremor with holding coffee cup.  He does not note tremor with rest.  Generally notes with holding an object (book, iPad).    There is nothing specific that he does that triggers tremor.   Wife notes that if he has elbows on table, she can feel the table shake at times.   No problem shaving.   Does ok with handwriting but has to take his time. Otherwise not noted changes in size.   Wife and he feel that tremor got markedly worse when he started sertraline. Dose reduced in half but did not relieve his anxiety. He feels sertraline is useless.     He does not drink alcohol.       Started falling when he was taking zoloft twice a day. Fell three times in one week. Zoloft was reduced to once a day about 6 mos ago (now taking 100 mg daily).   Lisinopril was then reduced as this all seemed to happen at same time.      Wife feels that his anxiety has gotten worse.   Anxiety -"definitely" and makes tremor worse.  Always fiddling with fingers or hands in mouth.   More honery per wife. More disagreeable.     Last fall was last Monday.   Wife says his balance has always been off a little. He actually feels his balance has not been good for 3-4 " years.   It did get worse with the zoloft.   They try to walk 2-3 times per week.   He has been on zoloft about a year.   He had falls / trips prior to the past 6 mos but has been more notable the past 6 mos.     Walks slower as afraid he will fall.     Dad had bad gait problem.   Father and brother had tremor.      Review of Systems   Constitutional:  Negative for appetite change.   HENT:  Negative for drooling, trouble swallowing and voice change.         Smell and taste seem to be changing - not as good   Eyes:  Negative for visual disturbance.   Respiratory:  Negative for cough and choking.    Cardiovascular:  Negative for chest pain, palpitations and leg swelling.   Gastrointestinal:  Negative for constipation and diarrhea.   Genitourinary: Negative.    Musculoskeletal:  Positive for gait problem.        Denies pain   Neurological:  Positive for dizziness (every now and then) and tremors. Negative for seizures, syncope and numbness.        Denies tingling or burning    Psychiatric/Behavioral:  Negative for dysphoric mood (he denies but wife disagrees) and sleep disturbance (use CPAP and with medication sleep is great). The patient is nervous/anxious.        Past Medical History: The patient  has a past medical history of a Chronic Anticoagulation With Eliquis, a Elevated Cardiac CT Calcium Score (###), a Mild Abdominal Aortic Ectasia (###), a Mild Nonobstructive Coronary Artery Disease, a Paroxysmal Atrial Fibrillation With H/O RVR, b Hypertension, c Hypercholesterolemia, d Family H/O DM, f Obesity, i Acute Bronchitis, i H/O COVID-19 Infection, i VIBHA On CPAP (###), j External Hemorrhoids, j H/O Colon Polyps On Previous TC (###), k Borderline Low Testosterone Level (###), k Family H/O Prostate Cancer, l Advanced Bilateral Knee Osteoarthritis, l Lumbar Spinal Degenerative Disc Disease, m Chronic Bilateral Hand Tremor (###), m Chronic Fatigue (###), n Chronic Insomnia, n Generalized Anxiety, o Allergic  Rhinosinusitis, q Left Great Toe Onychomycosis, and Wellness Visit 12/28/2022.    Social History: The patient  reports that he has never smoked. He has never been exposed to tobacco smoke. He has never used smokeless tobacco. He reports current alcohol use of about 3.0 standard drinks of alcohol per week. He reports that he does not use drugs.    Family History: Their family history includes Cancer in his father; Diabetes in his paternal grandmother; Heart disease in his father, maternal grandmother, and mother; Hyperlipidemia in his father and mother; Hypertension in his father and mother; Stroke in his paternal grandfather.    Allergies: Hydrocodone and Vicodin [hydrocodone-acetaminophen]     Meds:   Current Outpatient Medications on File Prior to Visit   Medication Sig Dispense Refill    apixaban (ELIQUIS) 5 mg Tab Take 5 mg by mouth 2 (two) times daily.      ascorbic acid, vitamin C, (VITAMIN C) 500 MG tablet Take 500 mg by mouth once daily.      aspirin (ECOTRIN) 81 MG EC tablet Take 1 tablet by mouth Daily.      flecainide (TAMBOCOR) 100 MG Tab Take 100 mg by mouth as needed.      glucosamine/chondr mckeon A sod (OSTEO BI-FLEX ORAL) Take by mouth 2 (two) times a day.      lisinopriL 10 MG tablet Take 1 tablet (10 mg total) by mouth once daily. 90 tablet 3    magnesium oxide 400 mg magnesium Tab Take by mouth.      meloxicam (MOBIC) 7.5 MG tablet TAKE 1 TABLET (7.5 MG TOTAL) BY MOUTH DAILY AS NEEDED FOR PAIN. 90 tablet 3    metoprolol succinate (TOPROL-XL) 50 MG 24 hr tablet Take 1 tablet (50 mg total) by mouth once daily. 90 tablet 3    MV,MINERALS/FA/LYCOPENE/GINKGO (ONE-A-DAY MEN'S 50+ ADVANTAGE ORAL) Take 1 tablet by mouth Daily.      NIFEdipine (ADALAT CC) 30 MG TbSR take 1 tablet by mouth daily 90 tablet 3    rosuvastatin (CRESTOR) 10 MG tablet Take 1 tablet (10 mg total) by mouth every evening. 90 tablet 1    sertraline (ZOLOFT) 100 MG tablet TAKE 1 & 1/2 TABLETS (150 MG TOTAL) BY MOUTH ONCE DAILY. (Patient  "taking differently: Patient takes once daily) 135 tablet 3    traZODone (DESYREL) 150 MG tablet take 1 tablet by mouth nightly as needed for insomnia 90 tablet 3    triamterene-hydrochlorothiazide 37.5-25 mg (DYAZIDE) 37.5-25 mg per capsule TAKE 1 CAPSULE BY MOUTH EVERY MORNING. 90 capsule 3    UNABLE TO FIND Vitamin D 3  1000 units      zaleplon (SONATA) 10 MG capsule take 1 capsule by mouth nightly as needed **no more than 3-4 doses per week** 30 capsule 3    clonazePAM (KLONOPIN) 0.5 MG tablet Take 0.5 tablets (0.25 mg total) by mouth nightly as needed for Anxiety. 15 tablet 0    clotrimazole (LOTRIMIN) 1 % Soln Apply topically 2 (two) times daily. LEFT EAR 3-4 drops twice daily X 3 weeks for 21 days 20 mL 0     No current facility-administered medications on file prior to visit.       Objective:     Physical Exam:    Vitals:    01/08/24 0812   BP: 112/66   BP Location: Right arm   Patient Position: Sitting   BP Method: Large (Automatic)   Pulse: (!) 59   Weight: 104 kg (229 lb 4.5 oz)   Height: 5' 5" (1.651 m)     Body mass index is 38.15 kg/m².    Constitutional  Well-developed, well-nourished, appears stated age   Cardiovascular  no LE edema bilaterally     ..  Neurological    * Mental status     - Orientation Oriented to: person, place, and situation       - Memory     Intact recent and remote     - Attention/concentration  Attends to interview without distraction     - Language  Spontaneous, fluent     - Fund of knowledge  Aware of current events     - Executive  Well-organized thoughts       ..  * Cranial nerves       - CN II  PERRL, visual fields full to confrontation     - CN III, IV, VI  Extraocular movements full, normal pursuits and saccades     - CN V  Sensation V1 - V3 intact     - CN VII  Face strong and symmetric bilaterally     - CN VIII  Hearing intact bilaterally     - CN IX, X  Palate raises midline and symmetric     - CN XI  SCM and trapezius 5/5 bilaterally     - CN XII  Tongue midline   * " Motor  Muscle bulk normal, strength 5/5 throughout   * Coordination  No dysmetria with finger-to-nose    * Gait  See below.     ..  * Specialized movement exam  No hypophonic speech.    No facial masking.     No cogwheel rigidity.      AJITH:    No bradykinesia.    Tremor:  Rest- suble tremor BH on diversion (most noted L 5th digit but slight)   Posture-slight BH, does fluctuate some but not entirely distractible  Kinesis- none  Intention- slight BH        No other dystonia, chorea, athetosis, myoclonus, or tics.   No motor impersistence.   Normal-based gait.   No shortened stride length.   No abnormal arm swing.             Laboratory Results:  No visits with results within 3 Month(s) from this visit.   Latest known visit with results is:   Office Visit on 09/19/2023   Component Date Value Ref Range Status    Aerobic Bacterial Culture 09/19/2023  (A)   Final                    Value:ASPERGILLUS FLAVUS  Rare             Imaging:   No results found for this or any previous visit.        Assessment and Plan     Tremors of nervous system  -     Ambulatory referral/consult to Neurology    Abnormality of gait  -     Ambulatory referral/consult to Neurology    Severe obesity (BMI 35.0-39.9) with comorbidity        Medical Decision Making:    No evidence of pdism at this time.   Describes tremor BH x 10 years, progressing and definitely worse when started sertraline. He does not find sertraline helpful and endorses definite anxiety, which exacerbates tremor. He also feels balance worsened with sertraline. Recommend  weaning off sertraline under direction of PCP and see if there is something else that woule be more beneficial.     Tremor is most consistent with essential tremor, which can cause balance problems as times goes on.   PT orer give for balance.     Does not want treamtent for termor.      Follow up 6-12 mos.           ..Total time: 68  minutes spent on the encounter, which includes face to face time and non-face  to face time preparing to see the patient (eg, review of tests), Obtaining and/or reviewing separately obtained history, Documenting clinical information in the electronic or other health record, Independently interpreting results (not separately reported) and communicating results to the patient/family/caregiver, or Care coordination (not separately reported).     Aarti Chavez DNP, NP-C  Division of Movement and Memory Disorders  [facility]  [phone]

## 2024-01-09 ENCOUNTER — TELEPHONE (OUTPATIENT)
Dept: GASTROENTEROLOGY | Facility: CLINIC | Age: 74
End: 2024-01-09
Payer: MEDICARE

## 2024-01-09 NOTE — TELEPHONE ENCOUNTER
----- Message from Natalia Washington sent at 1/9/2024  2:29 PM CST -----  Contact: Pt [phone]  Type: Needs Medical Advice  Who Called:  Pts wife Apolonia Nichole Call Back Number: [phone]    Additional Information: Apolonia is calling about path reports that she dropped off at the office today. She wants to make sure office received them. She also has a question about if pt is needing to take antibiotics before procedure. Pls call back and advise

## 2024-01-09 NOTE — TELEPHONE ENCOUNTER
Returned call to the patient's  wife, notified her of the receipt of the last 2 colonoscopies as well as path reports, all was given to Dr. Wild for review, Mrs Barksdale verbalized understanding of this.

## 2024-01-09 NOTE — TELEPHONE ENCOUNTER
----- Message from Jaja Cooley sent at 1/9/2024  2:09 PM CST -----  Regarding: Paper work Drop Off  Patients wife dropped off paper work for Dr. Wild.  It will be in the bin at the main registration desk on the 2nd floor.

## 2024-01-16 ENCOUNTER — TELEPHONE (OUTPATIENT)
Dept: GASTROENTEROLOGY | Facility: CLINIC | Age: 74
End: 2024-01-16
Payer: MEDICARE

## 2024-01-16 NOTE — TELEPHONE ENCOUNTER
Prep Information sent to patient portal.      MiraLAX Prep      ALERT: Please notify the clinic if you start any blood thinners as does affect your procedure  NOTE: NO ASPIRIN or ibuprofen products for the morning of your procedure. This includes Motrin, ALEVE, Advil, or other arthritis type medications. TYLENOL IS ALLOWED.  AVOID the following foods for 7 - 10 days prior to the procedure: Beans, peas, corn, nuts, popcorn, or tomatoes. If you forget we will not cancel your procedure.      **You will need to purchase over-the-counter  -    4 Dulcolax laxative tablets - any brand is fine   -    2 Liter Bottle or 64 oz. of any clear liquid - see list below       (Noncarbonated, Nothing RED or PURPLE)   -    MiraLAX (255 gram / 8 oz ) bottle of powder     The evening before your prep day, at bedtime, take 2 Dulcolax tablets    ONE DAY BEFORE THE PROCEDURE (PREP DAY):   1. You will be on a clear liquid diet the entire day before the procedure.  2. At 10 am you will take 2 more Dulcolax laxative tablets  3. At 5 pm mix one 8oz. glass of clear liquid with one capful of Miralax and drink it entirely.  4. Repeat every 15 minutes until you have finished the 2 liter/ 64 oz. of clear fluid.      It's about 8 - 10 glasses of fluid. You may have some MiraLAX left over.      CLEAR LIQUIDS INCLUDE: Coffee (no cream), tea, apple juice, white grape juice, limit carbonated drinks to 2 in 24 hours, boullion, chicken broth, beef broth, Gatorade, Osvaldo-Aid, jello, popsicles, snowballs, Italian ice, and hard candy. NO ALCOHOL. NOTHING RED OR PURPLE.    It is important to drink plenty of clear fluids throughout the day prior to the procedure while doing this prep. After midnight  WATER ONLY is allowed, then nothing by mouth 4 hours prior to the procedure time.    A preop nurse will call the day prior to your procedure to discuss medications you can and cannot take the morning of your procedure. Since sedation is used, you must have someone  drive you home. It is [facility] policy that you may not take a taxi home after sedation.         NOTE:  ·         Dulaglutide (Trulicity) (weekly) - hold 8 days  ·         Exenatide extended release (Bydureon bcise) (weekly) - hold 8 days  ·         Semaglutide (Ozempic) (weekly) - hold 8 days  Tirepatide (Mounjaro) (weekly) - hold 8 days  Wegovy (weekly) - hold 8 days  ·         Exenatide (Byetta) (twice daily)- hold DAY OF PROCEDURE  ·         Liraglutide (Victoza, Saxenda) (daily)- hold DAY OF PROCEDURE  ·         Lixisenatide (Adlyxin) (daily)- hold DAY OF PROCEDURE  ·         Semaglutide (Rybelsus) (daily) -hold DAY OF PROCEDURE

## 2024-02-23 PROBLEM — I50.32 CHRONIC DIASTOLIC CONGESTIVE HEART FAILURE: Status: ACTIVE | Noted: 2022-05-03

## 2024-05-02 ENCOUNTER — PROCEDURE VISIT (OUTPATIENT)
Dept: OTOLARYNGOLOGY | Facility: CLINIC | Age: 74
End: 2024-05-02
Payer: MEDICARE

## 2024-05-02 DIAGNOSIS — H61.23 BILATERAL IMPACTED CERUMEN: Primary | ICD-10-CM

## 2024-05-02 PROCEDURE — 69210 REMOVE IMPACTED EAR WAX UNI: CPT | Mod: S$PBB,,, | Performed by: NURSE PRACTITIONER

## 2024-05-02 PROCEDURE — 69210 REMOVE IMPACTED EAR WAX UNI: CPT | Mod: PBBFAC,PO | Performed by: NURSE PRACTITIONER

## 2024-05-02 NOTE — PROCEDURES
Ear Cerumen Removal    Date/Time: 5/2/2024 9:00 AM    Performed by: Agnes Cha NP  Authorized by: Agnes Cha NP    Consent Done?:  Not Needed    Local anesthetic:  None  Location details:  Both ears  Procedure type: curette    Cerumen  Removal Results:  Cerumen completely removed  Patient tolerance:  Patient tolerated the procedure well with no immediate complications

## (undated) DEVICE — GLOVE SURGICAL LATEX SZ 7

## (undated) DEVICE — TRAY NERVE BLOCK

## (undated) DEVICE — SYR GLASS 5CC LUER LOK

## (undated) DEVICE — NDL TUOHY EPIDURAL 20G X 3.5

## (undated) DEVICE — APPLICATOR CHLORAPREP CLR 10.5

## (undated) DEVICE — MARKER SKIN STND TIP BLUE BARR